# Patient Record
Sex: MALE | Race: WHITE | NOT HISPANIC OR LATINO
[De-identification: names, ages, dates, MRNs, and addresses within clinical notes are randomized per-mention and may not be internally consistent; named-entity substitution may affect disease eponyms.]

---

## 2018-04-06 ENCOUNTER — APPOINTMENT (OUTPATIENT)
Dept: ORTHOPEDIC SURGERY | Facility: CLINIC | Age: 53
End: 2018-04-06
Payer: COMMERCIAL

## 2018-04-06 DIAGNOSIS — Z78.9 OTHER SPECIFIED HEALTH STATUS: ICD-10-CM

## 2018-04-06 DIAGNOSIS — Z87.39 PERSONAL HISTORY OF OTHER DISEASES OF THE MUSCULOSKELETAL SYSTEM AND CONNECTIVE TISSUE: ICD-10-CM

## 2018-04-06 DIAGNOSIS — Z80.0 FAMILY HISTORY OF MALIGNANT NEOPLASM OF DIGESTIVE ORGANS: ICD-10-CM

## 2018-04-06 DIAGNOSIS — Z80.1 FAMILY HISTORY OF MALIGNANT NEOPLASM OF TRACHEA, BRONCHUS AND LUNG: ICD-10-CM

## 2018-04-06 DIAGNOSIS — F17.290 NICOTINE DEPENDENCE, OTHER TOBACCO PRODUCT, UNCOMPLICATED: ICD-10-CM

## 2018-04-06 PROCEDURE — 99214 OFFICE O/P EST MOD 30 MIN: CPT

## 2018-04-06 PROCEDURE — 72100 X-RAY EXAM L-S SPINE 2/3 VWS: CPT

## 2018-04-06 PROCEDURE — 73521 X-RAY EXAM HIPS BI 2 VIEWS: CPT

## 2018-04-10 RX ORDER — ASPIRIN 81 MG
81 TABLET, DELAYED RELEASE (ENTERIC COATED) ORAL
Refills: 0 | Status: ACTIVE | COMMUNITY

## 2018-04-20 ENCOUNTER — TRANSCRIPTION ENCOUNTER (OUTPATIENT)
Age: 53
End: 2018-04-20

## 2018-05-29 ENCOUNTER — OUTPATIENT (OUTPATIENT)
Dept: OUTPATIENT SERVICES | Facility: HOSPITAL | Age: 53
LOS: 1 days | End: 2018-05-29

## 2018-05-29 VITALS
HEART RATE: 59 BPM | SYSTOLIC BLOOD PRESSURE: 140 MMHG | TEMPERATURE: 99 F | HEIGHT: 67 IN | DIASTOLIC BLOOD PRESSURE: 78 MMHG | RESPIRATION RATE: 14 BRPM | WEIGHT: 203.93 LBS

## 2018-05-29 DIAGNOSIS — M16.12 UNILATERAL PRIMARY OSTEOARTHRITIS, LEFT HIP: ICD-10-CM

## 2018-05-29 DIAGNOSIS — M19.90 UNSPECIFIED OSTEOARTHRITIS, UNSPECIFIED SITE: ICD-10-CM

## 2018-05-29 LAB
APPEARANCE UR: CLEAR — SIGNIFICANT CHANGE UP
BILIRUB UR-MCNC: NEGATIVE — SIGNIFICANT CHANGE UP
BLD GP AB SCN SERPL QL: NEGATIVE — SIGNIFICANT CHANGE UP
BLOOD UR QL VISUAL: NEGATIVE — SIGNIFICANT CHANGE UP
BUN SERPL-MCNC: 17 MG/DL — SIGNIFICANT CHANGE UP (ref 7–23)
CALCIUM SERPL-MCNC: 9.4 MG/DL — SIGNIFICANT CHANGE UP (ref 8.4–10.5)
CHLORIDE SERPL-SCNC: 100 MMOL/L — SIGNIFICANT CHANGE UP (ref 98–107)
CO2 SERPL-SCNC: 25 MMOL/L — SIGNIFICANT CHANGE UP (ref 22–31)
COLOR SPEC: SIGNIFICANT CHANGE UP
CREAT SERPL-MCNC: 0.94 MG/DL — SIGNIFICANT CHANGE UP (ref 0.5–1.3)
GLUCOSE SERPL-MCNC: 75 MG/DL — SIGNIFICANT CHANGE UP (ref 70–99)
GLUCOSE UR-MCNC: NEGATIVE — SIGNIFICANT CHANGE UP
HBA1C BLD-MCNC: 5.4 % — SIGNIFICANT CHANGE UP (ref 4–5.6)
HCT VFR BLD CALC: 45.9 % — SIGNIFICANT CHANGE UP (ref 39–50)
HGB BLD-MCNC: 15.2 G/DL — SIGNIFICANT CHANGE UP (ref 13–17)
KETONES UR-MCNC: NEGATIVE — SIGNIFICANT CHANGE UP
LEUKOCYTE ESTERASE UR-ACNC: NEGATIVE — SIGNIFICANT CHANGE UP
MCHC RBC-ENTMCNC: 29.4 PG — SIGNIFICANT CHANGE UP (ref 27–34)
MCHC RBC-ENTMCNC: 33.1 % — SIGNIFICANT CHANGE UP (ref 32–36)
MCV RBC AUTO: 88.8 FL — SIGNIFICANT CHANGE UP (ref 80–100)
NITRITE UR-MCNC: NEGATIVE — SIGNIFICANT CHANGE UP
NRBC # FLD: 0 — SIGNIFICANT CHANGE UP
PH UR: 6 — SIGNIFICANT CHANGE UP (ref 4.6–8)
PLATELET # BLD AUTO: 244 K/UL — SIGNIFICANT CHANGE UP (ref 150–400)
PMV BLD: 10.1 FL — SIGNIFICANT CHANGE UP (ref 7–13)
POTASSIUM SERPL-MCNC: 4 MMOL/L — SIGNIFICANT CHANGE UP (ref 3.5–5.3)
POTASSIUM SERPL-SCNC: 4 MMOL/L — SIGNIFICANT CHANGE UP (ref 3.5–5.3)
PROT UR-MCNC: NEGATIVE MG/DL — SIGNIFICANT CHANGE UP
RBC # BLD: 5.17 M/UL — SIGNIFICANT CHANGE UP (ref 4.2–5.8)
RBC # FLD: 13 % — SIGNIFICANT CHANGE UP (ref 10.3–14.5)
RH IG SCN BLD-IMP: POSITIVE — SIGNIFICANT CHANGE UP
SODIUM SERPL-SCNC: 140 MMOL/L — SIGNIFICANT CHANGE UP (ref 135–145)
SP GR SPEC: 1.01 — SIGNIFICANT CHANGE UP (ref 1–1.04)
UROBILINOGEN FLD QL: NORMAL MG/DL — SIGNIFICANT CHANGE UP
WBC # BLD: 6.4 K/UL — SIGNIFICANT CHANGE UP (ref 3.8–10.5)
WBC # FLD AUTO: 6.4 K/UL — SIGNIFICANT CHANGE UP (ref 3.8–10.5)
WBC UR QL: SIGNIFICANT CHANGE UP (ref 0–?)

## 2018-05-29 RX ORDER — SODIUM CHLORIDE 9 MG/ML
3 INJECTION INTRAMUSCULAR; INTRAVENOUS; SUBCUTANEOUS EVERY 8 HOURS
Qty: 0 | Refills: 0 | Status: DISCONTINUED | OUTPATIENT
Start: 2018-06-18 | End: 2018-06-19

## 2018-05-29 RX ORDER — ACETAMINOPHEN 500 MG
975 TABLET ORAL ONCE
Qty: 0 | Refills: 0 | Status: COMPLETED | OUTPATIENT
Start: 2018-06-18 | End: 2018-06-18

## 2018-05-29 RX ORDER — ASPIRIN/CALCIUM CARB/MAGNESIUM 324 MG
1 TABLET ORAL
Qty: 0 | Refills: 0 | COMMUNITY

## 2018-05-29 RX ORDER — TRAMADOL HYDROCHLORIDE 50 MG/1
50 TABLET ORAL ONCE
Qty: 0 | Refills: 0 | Status: DISCONTINUED | OUTPATIENT
Start: 2018-06-18 | End: 2018-06-18

## 2018-05-29 RX ORDER — SODIUM CHLORIDE 9 MG/ML
1000 INJECTION, SOLUTION INTRAVENOUS
Qty: 0 | Refills: 0 | Status: DISCONTINUED | OUTPATIENT
Start: 2018-06-18 | End: 2018-06-18

## 2018-05-29 RX ORDER — PANTOPRAZOLE SODIUM 20 MG/1
40 TABLET, DELAYED RELEASE ORAL ONCE
Qty: 0 | Refills: 0 | Status: COMPLETED | OUTPATIENT
Start: 2018-06-18 | End: 2018-06-18

## 2018-05-29 NOTE — H&P PST ADULT - NEGATIVE CARDIOVASCULAR SYMPTOMS
no orthopnea/no paroxysmal nocturnal dyspnea/no peripheral edema/no palpitations/no dyspnea on exertion/no chest pain/no claudication

## 2018-05-29 NOTE — H&P PST ADULT - NEGATIVE GENERAL SYMPTOMS
no anorexia/no weight loss/no fever/no chills/no sweating/no polydipsia/no fatigue/no weight gain/no polyphagia/no polyuria/no malaise

## 2018-05-29 NOTE — H&P PST ADULT - RS GEN PE MLT RESP DETAILS PC
clear to auscultation bilaterally/breath sounds equal/no intercostal retractions/no rales/no wheezes/no chest wall tenderness/no rhonchi/respirations non-labored/good air movement

## 2018-05-29 NOTE — H&P PST ADULT - NSANTHOSAYNRD_GEN_A_CORE
No. KERRY screening performed.  STOP BANG Legend: 0-2 = LOW Risk; 3-4 = INTERMEDIATE Risk; 5-8 = HIGH Risk

## 2018-05-29 NOTE — H&P PST ADULT - GASTROINTESTINAL DETAILS
no distention/no guarding/no rebound tenderness/no bruit/no rigidity/no organomegaly/nontender/no masses palpable/bowel sounds normal/soft

## 2018-05-29 NOTE — H&P PST ADULT - NEGATIVE GENERAL GENITOURINARY SYMPTOMS
normal urinary frequency/no hematuria/no flank pain R/no bladder infections/no nocturia/no dysuria/no flank pain L

## 2018-05-29 NOTE — H&P PST ADULT - PROBLEM SELECTOR PLAN 1
Pt scheduled for left total hip replacement direct anterior approach on 6/18/2018.  labs done results pending, ekg done.  Hibiclens provided.  Preop teaching done, pt able to verbalize understanding.

## 2018-05-29 NOTE — H&P PST ADULT - HISTORY OF PRESENT ILLNESS
52y/o male scheduled for left total hip replacement direct anterior approach on 6/18/2018.  Pt states, "left hip pain for the past 20yrs, pain worse over the past 5 yrs.  Xray shows bone on bone. Dx with osteoarthritis 20 yrs ago.  Possible congential defect."

## 2018-05-29 NOTE — H&P PST ADULT - MARITAL STATUS
3 children, mother  67y/o colon and lung cancer, father  72 heart disease, RA, 4 siblings 2 brothers hx of prostate cancer, 1 brother with cardiac stents/

## 2018-05-30 LAB
BACTERIA UR CULT: SIGNIFICANT CHANGE UP
SPECIMEN SOURCE: SIGNIFICANT CHANGE UP
SPECIMEN SOURCE: SIGNIFICANT CHANGE UP

## 2018-05-31 LAB — BACTERIA NPH CULT: SIGNIFICANT CHANGE UP

## 2018-06-11 ENCOUNTER — OTHER (OUTPATIENT)
Age: 53
End: 2018-06-11

## 2018-06-13 ENCOUNTER — OTHER (OUTPATIENT)
Age: 53
End: 2018-06-13

## 2018-06-13 DIAGNOSIS — M16.11 UNILATERAL PRIMARY OSTEOARTHRITIS, RIGHT HIP: ICD-10-CM

## 2018-06-13 DIAGNOSIS — M16.12 UNILATERAL PRIMARY OSTEOARTHRITIS, LEFT HIP: ICD-10-CM

## 2018-06-17 ENCOUNTER — TRANSCRIPTION ENCOUNTER (OUTPATIENT)
Age: 53
End: 2018-06-17

## 2018-06-18 ENCOUNTER — INPATIENT (INPATIENT)
Facility: HOSPITAL | Age: 53
LOS: 0 days | Discharge: HOME | End: 2018-06-19
Attending: ORTHOPAEDIC SURGERY | Admitting: ORTHOPAEDIC SURGERY
Payer: COMMERCIAL

## 2018-06-18 ENCOUNTER — RESULT REVIEW (OUTPATIENT)
Age: 53
End: 2018-06-18

## 2018-06-18 ENCOUNTER — APPOINTMENT (OUTPATIENT)
Dept: ORTHOPEDIC SURGERY | Facility: HOSPITAL | Age: 53
End: 2018-06-18

## 2018-06-18 ENCOUNTER — TRANSCRIPTION ENCOUNTER (OUTPATIENT)
Age: 53
End: 2018-06-18

## 2018-06-18 VITALS
HEART RATE: 69 BPM | OXYGEN SATURATION: 97 % | HEIGHT: 67 IN | TEMPERATURE: 98 F | WEIGHT: 203.93 LBS | RESPIRATION RATE: 16 BRPM | DIASTOLIC BLOOD PRESSURE: 87 MMHG | SYSTOLIC BLOOD PRESSURE: 149 MMHG

## 2018-06-18 DIAGNOSIS — M16.12 UNILATERAL PRIMARY OSTEOARTHRITIS, LEFT HIP: ICD-10-CM

## 2018-06-18 DIAGNOSIS — Z09 ENCOUNTER FOR FOLLOW-UP EXAMINATION AFTER COMPLETED TREATMENT FOR CONDITIONS OTHER THAN MALIGNANT NEOPLASM: ICD-10-CM

## 2018-06-18 DIAGNOSIS — F17.200 NICOTINE DEPENDENCE, UNSPECIFIED, UNCOMPLICATED: ICD-10-CM

## 2018-06-18 LAB
BUN SERPL-MCNC: 15 MG/DL — SIGNIFICANT CHANGE UP (ref 7–23)
CALCIUM SERPL-MCNC: 8.5 MG/DL — SIGNIFICANT CHANGE UP (ref 8.4–10.5)
CHLORIDE SERPL-SCNC: 104 MMOL/L — SIGNIFICANT CHANGE UP (ref 98–107)
CO2 SERPL-SCNC: 24 MMOL/L — SIGNIFICANT CHANGE UP (ref 22–31)
CREAT SERPL-MCNC: 1.01 MG/DL — SIGNIFICANT CHANGE UP (ref 0.5–1.3)
GLUCOSE BLDC GLUCOMTR-MCNC: 97 MG/DL — SIGNIFICANT CHANGE UP (ref 70–99)
GLUCOSE SERPL-MCNC: 124 MG/DL — HIGH (ref 70–99)
HCT VFR BLD CALC: 40.3 % — SIGNIFICANT CHANGE UP (ref 39–50)
HGB BLD-MCNC: 13.6 G/DL — SIGNIFICANT CHANGE UP (ref 13–17)
MCHC RBC-ENTMCNC: 29.1 PG — SIGNIFICANT CHANGE UP (ref 27–34)
MCHC RBC-ENTMCNC: 33.7 % — SIGNIFICANT CHANGE UP (ref 32–36)
MCV RBC AUTO: 86.1 FL — SIGNIFICANT CHANGE UP (ref 80–100)
NRBC # FLD: 0 — SIGNIFICANT CHANGE UP
PLATELET # BLD AUTO: 220 K/UL — SIGNIFICANT CHANGE UP (ref 150–400)
PMV BLD: 9.8 FL — SIGNIFICANT CHANGE UP (ref 7–13)
POTASSIUM SERPL-MCNC: 4.2 MMOL/L — SIGNIFICANT CHANGE UP (ref 3.5–5.3)
POTASSIUM SERPL-SCNC: 4.2 MMOL/L — SIGNIFICANT CHANGE UP (ref 3.5–5.3)
RBC # BLD: 4.68 M/UL — SIGNIFICANT CHANGE UP (ref 4.2–5.8)
RBC # FLD: 13.1 % — SIGNIFICANT CHANGE UP (ref 10.3–14.5)
RH IG SCN BLD-IMP: POSITIVE — SIGNIFICANT CHANGE UP
SODIUM SERPL-SCNC: 141 MMOL/L — SIGNIFICANT CHANGE UP (ref 135–145)
WBC # BLD: 10.38 K/UL — SIGNIFICANT CHANGE UP (ref 3.8–10.5)
WBC # FLD AUTO: 10.38 K/UL — SIGNIFICANT CHANGE UP (ref 3.8–10.5)

## 2018-06-18 PROCEDURE — 88311 DECALCIFY TISSUE: CPT | Mod: 26

## 2018-06-18 PROCEDURE — 88305 TISSUE EXAM BY PATHOLOGIST: CPT | Mod: 26

## 2018-06-18 PROCEDURE — 27130 TOTAL HIP ARTHROPLASTY: CPT | Mod: LT

## 2018-06-18 PROCEDURE — 72170 X-RAY EXAM OF PELVIS: CPT | Mod: 26

## 2018-06-18 PROCEDURE — 99223 1ST HOSP IP/OBS HIGH 75: CPT

## 2018-06-18 RX ORDER — SODIUM CHLORIDE 9 MG/ML
1000 INJECTION INTRAMUSCULAR; INTRAVENOUS; SUBCUTANEOUS ONCE
Qty: 0 | Refills: 0 | Status: COMPLETED | OUTPATIENT
Start: 2018-06-19 | End: 2018-06-19

## 2018-06-18 RX ORDER — HYDROMORPHONE HYDROCHLORIDE 2 MG/ML
0.5 INJECTION INTRAMUSCULAR; INTRAVENOUS; SUBCUTANEOUS
Qty: 0 | Refills: 0 | Status: DISCONTINUED | OUTPATIENT
Start: 2018-06-18 | End: 2018-06-18

## 2018-06-18 RX ORDER — ACETAMINOPHEN 500 MG
650 TABLET ORAL EVERY 6 HOURS
Qty: 0 | Refills: 0 | Status: DISCONTINUED | OUTPATIENT
Start: 2018-06-18 | End: 2018-06-19

## 2018-06-18 RX ORDER — SODIUM CHLORIDE 9 MG/ML
1000 INJECTION, SOLUTION INTRAVENOUS
Qty: 0 | Refills: 0 | Status: DISCONTINUED | OUTPATIENT
Start: 2018-06-18 | End: 2018-06-19

## 2018-06-18 RX ORDER — OXYCODONE HYDROCHLORIDE 5 MG/1
10 TABLET ORAL EVERY 4 HOURS
Qty: 0 | Refills: 0 | Status: DISCONTINUED | OUTPATIENT
Start: 2018-06-18 | End: 2018-06-19

## 2018-06-18 RX ORDER — PANTOPRAZOLE SODIUM 20 MG/1
40 TABLET, DELAYED RELEASE ORAL DAILY
Qty: 0 | Refills: 0 | Status: DISCONTINUED | OUTPATIENT
Start: 2018-06-18 | End: 2018-06-19

## 2018-06-18 RX ORDER — LORATADINE 10 MG/1
10 TABLET ORAL DAILY
Qty: 0 | Refills: 0 | Status: DISCONTINUED | OUTPATIENT
Start: 2018-06-18 | End: 2018-06-19

## 2018-06-18 RX ORDER — POLYETHYLENE GLYCOL 3350 17 G/17G
17 POWDER, FOR SOLUTION ORAL DAILY
Qty: 0 | Refills: 0 | Status: DISCONTINUED | OUTPATIENT
Start: 2018-06-18 | End: 2018-06-19

## 2018-06-18 RX ORDER — HYDROMORPHONE HYDROCHLORIDE 2 MG/ML
0.5 INJECTION INTRAMUSCULAR; INTRAVENOUS; SUBCUTANEOUS EVERY 4 HOURS
Qty: 0 | Refills: 0 | Status: DISCONTINUED | OUTPATIENT
Start: 2018-06-18 | End: 2018-06-18

## 2018-06-18 RX ORDER — TRAMADOL HYDROCHLORIDE 50 MG/1
50 TABLET ORAL EVERY 8 HOURS
Qty: 0 | Refills: 0 | Status: DISCONTINUED | OUTPATIENT
Start: 2018-06-18 | End: 2018-06-18

## 2018-06-18 RX ORDER — CELECOXIB 200 MG/1
200 CAPSULE ORAL DAILY
Qty: 0 | Refills: 0 | Status: DISCONTINUED | OUTPATIENT
Start: 2018-06-20 | End: 2018-06-19

## 2018-06-18 RX ORDER — DOCUSATE SODIUM 100 MG
100 CAPSULE ORAL THREE TIMES A DAY
Qty: 0 | Refills: 0 | Status: DISCONTINUED | OUTPATIENT
Start: 2018-06-18 | End: 2018-06-19

## 2018-06-18 RX ORDER — SENNA PLUS 8.6 MG/1
2 TABLET ORAL AT BEDTIME
Qty: 0 | Refills: 0 | Status: DISCONTINUED | OUTPATIENT
Start: 2018-06-18 | End: 2018-06-19

## 2018-06-18 RX ORDER — TRAMADOL HYDROCHLORIDE 50 MG/1
50 TABLET ORAL EVERY 8 HOURS
Qty: 0 | Refills: 0 | Status: DISCONTINUED | OUTPATIENT
Start: 2018-06-18 | End: 2018-06-19

## 2018-06-18 RX ORDER — KETOROLAC TROMETHAMINE 30 MG/ML
15 SYRINGE (ML) INJECTION EVERY 6 HOURS
Qty: 0 | Refills: 0 | Status: DISCONTINUED | OUTPATIENT
Start: 2018-06-18 | End: 2018-06-19

## 2018-06-18 RX ORDER — DEXAMETHASONE 0.5 MG/5ML
10 ELIXIR ORAL ONCE
Qty: 0 | Refills: 0 | Status: COMPLETED | OUTPATIENT
Start: 2018-06-18 | End: 2018-06-18

## 2018-06-18 RX ORDER — ONDANSETRON 8 MG/1
4 TABLET, FILM COATED ORAL EVERY 6 HOURS
Qty: 0 | Refills: 0 | Status: DISCONTINUED | OUTPATIENT
Start: 2018-06-18 | End: 2018-06-19

## 2018-06-18 RX ORDER — HYDROMORPHONE HYDROCHLORIDE 2 MG/ML
0.5 INJECTION INTRAMUSCULAR; INTRAVENOUS; SUBCUTANEOUS EVERY 4 HOURS
Qty: 0 | Refills: 0 | Status: DISCONTINUED | OUTPATIENT
Start: 2018-06-18 | End: 2018-06-19

## 2018-06-18 RX ORDER — ASPIRIN/CALCIUM CARB/MAGNESIUM 324 MG
325 TABLET ORAL
Qty: 0 | Refills: 0 | Status: DISCONTINUED | OUTPATIENT
Start: 2018-06-18 | End: 2018-06-19

## 2018-06-18 RX ORDER — CEFAZOLIN SODIUM 1 G
2000 VIAL (EA) INJECTION EVERY 8 HOURS
Qty: 0 | Refills: 0 | Status: COMPLETED | OUTPATIENT
Start: 2018-06-18 | End: 2018-06-18

## 2018-06-18 RX ORDER — HYDROMORPHONE HYDROCHLORIDE 2 MG/ML
0.25 INJECTION INTRAMUSCULAR; INTRAVENOUS; SUBCUTANEOUS
Qty: 0 | Refills: 0 | Status: DISCONTINUED | OUTPATIENT
Start: 2018-06-18 | End: 2018-06-18

## 2018-06-18 RX ORDER — SODIUM CHLORIDE 9 MG/ML
1000 INJECTION INTRAMUSCULAR; INTRAVENOUS; SUBCUTANEOUS ONCE
Qty: 0 | Refills: 0 | Status: COMPLETED | OUTPATIENT
Start: 2018-06-18 | End: 2018-06-18

## 2018-06-18 RX ORDER — DEXAMETHASONE 0.5 MG/5ML
10 ELIXIR ORAL ONCE
Qty: 0 | Refills: 0 | Status: COMPLETED | OUTPATIENT
Start: 2018-06-19 | End: 2018-06-19

## 2018-06-18 RX ORDER — OXYCODONE HYDROCHLORIDE 5 MG/1
5 TABLET ORAL EVERY 4 HOURS
Qty: 0 | Refills: 0 | Status: DISCONTINUED | OUTPATIENT
Start: 2018-06-18 | End: 2018-06-19

## 2018-06-18 RX ADMIN — Medication 150 MILLIGRAM(S): at 06:40

## 2018-06-18 RX ADMIN — Medication 325 MILLIGRAM(S): at 17:47

## 2018-06-18 RX ADMIN — Medication 102 MILLIGRAM(S): at 20:01

## 2018-06-18 RX ADMIN — Medication 100 MILLIGRAM(S): at 23:17

## 2018-06-18 RX ADMIN — SODIUM CHLORIDE 150 MILLILITER(S): 9 INJECTION, SOLUTION INTRAVENOUS at 09:50

## 2018-06-18 RX ADMIN — SODIUM CHLORIDE 1000 MILLILITER(S): 9 INJECTION INTRAMUSCULAR; INTRAVENOUS; SUBCUTANEOUS at 11:34

## 2018-06-18 RX ADMIN — Medication 100 MILLIGRAM(S): at 13:38

## 2018-06-18 RX ADMIN — SODIUM CHLORIDE 3 MILLILITER(S): 9 INJECTION INTRAMUSCULAR; INTRAVENOUS; SUBCUTANEOUS at 21:43

## 2018-06-18 RX ADMIN — SENNA PLUS 2 TABLET(S): 8.6 TABLET ORAL at 21:54

## 2018-06-18 RX ADMIN — Medication 650 MILLIGRAM(S): at 12:12

## 2018-06-18 RX ADMIN — Medication 650 MILLIGRAM(S): at 23:17

## 2018-06-18 RX ADMIN — SODIUM CHLORIDE 150 MILLILITER(S): 9 INJECTION, SOLUTION INTRAVENOUS at 11:34

## 2018-06-18 RX ADMIN — Medication 15 MILLIGRAM(S): at 17:48

## 2018-06-18 RX ADMIN — Medication 100 MILLIGRAM(S): at 15:55

## 2018-06-18 RX ADMIN — Medication 650 MILLIGRAM(S): at 17:47

## 2018-06-18 RX ADMIN — PANTOPRAZOLE SODIUM 40 MILLIGRAM(S): 20 TABLET, DELAYED RELEASE ORAL at 06:40

## 2018-06-18 RX ADMIN — Medication 975 MILLIGRAM(S): at 06:40

## 2018-06-18 RX ADMIN — PANTOPRAZOLE SODIUM 40 MILLIGRAM(S): 20 TABLET, DELAYED RELEASE ORAL at 12:12

## 2018-06-18 RX ADMIN — TRAMADOL HYDROCHLORIDE 50 MILLIGRAM(S): 50 TABLET ORAL at 06:40

## 2018-06-18 RX ADMIN — Medication 100 MILLIGRAM(S): at 21:54

## 2018-06-18 RX ADMIN — SODIUM CHLORIDE 30 MILLILITER(S): 9 INJECTION, SOLUTION INTRAVENOUS at 06:40

## 2018-06-18 NOTE — DISCHARGE NOTE ADULT - PLAN OF CARE
pain control-> pain med may cause drowsiness, refrain from activities require decision making; physical therapy to help resume activities of daily living Office appointment is already made (see card attached to discharge folder) so if you need to reschedule please call Dr. Vega's office [phone]  weight bearing as tolerated to Left leg  TOTAL HIP PRECAUTIONS ANTERIOR

## 2018-06-18 NOTE — ASU PREOP CHECKLIST - SELECT TESTS ORDERED
EKG/CBC/Type and Cross/Urinalysis/BMP/hga1c, nasal swab, urine cul EKG/CBC/Type and Cross/POCT Blood Glucose/Urinalysis/BMP/hga1c, nasal swab, urine cul

## 2018-06-18 NOTE — DISCHARGE NOTE ADULT - ADDITIONAL INSTRUCTIONS
post surgical care  54yo male is s/p elective Left total hip arthroplasty 6/18/2018 without any intraoperative complications.  Patient is doing well and stable for discharge.  Patient is tolerating physical therapy: weight bearing to Left leg, gait training, STRICT ANTERIOR HIP PRECAUTIONS.  Keep dressing on as is until day of staple removal.  Staples/sutures to be removed in Dr. Vega's office 14 days from date of surgery.  Patient is on Aspirin (MUST TAKE WITH FOOD) for anticoagulation.  Orthopaedic Surgeon Dr. Vega would like patient to call private MD/Internist for appointment postop to maintain continuity of care.  Follow up with Dr. Vega's office in 1-2 weeks.

## 2018-06-18 NOTE — PHYSICAL THERAPY INITIAL EVALUATION ADULT - PATIENT PROFILE REVIEW, REHAB EVAL
yes/OK to perform PT evaluation as per ARACELI Lackey, activity order- ambulate as tolerated with a walker

## 2018-06-18 NOTE — CONSULT NOTE ADULT - PROBLEM SELECTOR RECOMMENDATION 9
s/p L total hip replacement POD#0  PT/OT  Pain control- Continue current pain regimen. pt reports he doesn't like opiods.  Bowel regimen  Encourage incentive spirometry  DVT ppx as per ortho s/p L total hip replacement POD#0  Perioperative abx as per ortho  PT/OT  Pain control- Continue current pain regimen. pt reports he doesn't like opiods.  Bowel regimen  Encourage incentive spirometry  DVT ppx as per ortho

## 2018-06-18 NOTE — CONSULT NOTE ADULT - ASSESSMENT
53 year old man PMH of osteoarthritis diagnosed 20 years ago and worsening L hip pain presented for scheduled left total hip replacement via direct anterior approach on 6/18/2018. Pt is  POD#0 doing well

## 2018-06-18 NOTE — DISCHARGE NOTE ADULT - INSTRUCTIONS
resume same diet as prior to surgery You have a postop appointment with dr Vega on 7/6/2018 @ 8:00 am, please keep waterproof dressing on until this appointment.  Notify Dr Vega if you experience any increase in pain not relieved with pain medication, anyredness, drainage or swelling around incision or if you develop a fever >101.0  Drink plentyof fluids.  Continue to do exercises as instructed.  Maintain anterior hip precautions.  do not sit in a chair for longer than 45 minutes twice a day.  Use over the counter stool softeners to assist with constipation which can be a side effect of your pain medication. You have a postop appointment with dr Vega on 7/6/2018 @ 8:00 am, please keep waterproof dressing on until this appointment.  Notify Dr Vega if you experience any increase in pain not relieved with pain medication, any redness, drainage or swelling around incision or if you develop a fever >101.0  Drink plenty of fluids.  Continue to do exercises as instructed.  Maintain anterior hip precautions.  Do not sit in a chair for longer than 45 minutes twice a day.  Use over the counter stool softeners to assist with constipation which can be a side effect of your pain medication.

## 2018-06-18 NOTE — PROGRESS NOTE ADULT - SUBJECTIVE AND OBJECTIVE BOX
Ortho Post Op Note    Patient resting comfortably, pain controlled, no acute events.  Patient denies any chest pain, shortness of breath, nausea, vomiting, diarrhea, headache or dizziness.  Denies any numbness or tingling.  Patient currently has no complaints.    Vital Signs: Vital Signs Last 24 Hrs  T(C): 36.7 (18 Jun 2018 11:32), Max: 36.7 (18 Jun 2018 06:05)  T(F): 98 (18 Jun 2018 11:32), Max: 98.1 (18 Jun 2018 06:05)  HR: 87 (18 Jun 2018 11:32) (69 - 96)  BP: 143/85 (18 Jun 2018 11:32) (124/85 - 149/87)  BP(mean): --  RR: 17 (18 Jun 2018 11:32) (11 - 17)  SpO2: 98% (18 Jun 2018 11:32) (97% - 100%)    Gen: Alert, NAD  LLE: Dressing clean, dry and intact, no erythema.  Extremity warm, brisk capillary refill, compartments soft, +2 DP pulse, no calf tenderness.  5/5 EHL/FHL/GS/TA, SILT S/S/DP/SP/T    A/P: 53y Male s/p left total hip replacement  1. Pain management  2. Ancef x24 hours  3. WBAT, anterior precautions  4. PT/OT  5. ASA BID for DVT ppx   6. Venodynes  7. Incentive spirometry  8. D/c planning

## 2018-06-18 NOTE — DISCHARGE NOTE ADULT - MEDICATION SUMMARY - MEDICATIONS TO TAKE
I will START or STAY ON the medications listed below when I get home from the hospital:    Mobic 15 mg oral tablet  -- 1 tab(s) by mouth once a day MDD:1  -- Do not take this drug if you are pregnant.  Obtain medical advice before taking any non-prescription drugs as some may affect the action of this medication.  Take with food or milk.    -- Indication: For Pain    Percocet 5/325 oral tablet  -- 1 -2 tab(s) by mouth every 4 hours MDD:8.  -- Caution federal law prohibits the transfer of this drug to any person other  than the person for whom it was prescribed.  May cause drowsiness.  Alcohol may intensify this effect.  Use care when operating dangerous machinery.  This prescription cannot be refilled.  This product contains acetaminophen.  Do not use  with any other product containing acetaminophen to prevent possible liver damage.  Using more of this medication than prescribed may cause serious breathing problems.    -- Indication: For Pain    traMADol 50 mg oral tablet  -- 1 tab(s) by mouth every 8 hours MDD:3  -- Indication: For Pain    aspirin 325 mg oral delayed release tablet  -- 1 tab(s) by mouth 2 times a day MDD:2  -- Indication: For Prevention of blood clots    gabapentin 100 mg oral capsule  -- 1 cap(s) by mouth 3 times a day MDD:3  -- It is very important that you take or use this exactly as directed.  Do not skip doses or discontinue unless directed by your doctor.  May cause drowsiness.  Alcohol may intensify this effect.  Use care when operating dangerous machinery.    -- Indication: For Pain    cetirizine 10 mg oral tablet  -- 1 tab(s) by mouth once a day  -- Indication: For Home med    docusate sodium 100 mg oral capsule  -- 1 cap(s) by mouth 3 times a day MDD:3  -- Indication: For Stool softener    senna oral tablet  -- 2 tab(s) by mouth once a day (at bedtime) MDD:2  -- Indication: For Stool softener    pantoprazole 40 mg oral delayed release tablet  -- 1 tab(s) by mouth once a day MDD:1  -- Indication: For Stomach protectant    Vitamin C 1000 mg oral tablet  -- 1 tab(s) by mouth once a day  -- Indication: For vit    vitamin E 400 intl units oral capsule  -- 1 cap(s) by mouth once a day  last dsoe 6/11  -- Indication: For vit

## 2018-06-18 NOTE — DISCHARGE NOTE ADULT - NS AS DC FOLLOWUP STROKE INST
carenotes on total hip, anterior precautions, exercise worksheet, d/c medications and side effects pamphlet

## 2018-06-18 NOTE — DISCHARGE NOTE ADULT - HOME CARE AGENCY
[facility]  [phone]  Visiting RN to see patient day after discharge; Start of visit 6/20/18; Physical therapy to follow

## 2018-06-18 NOTE — OCCUPATIONAL THERAPY INITIAL EVALUATION ADULT - PERTINENT HX OF CURRENT PROBLEM, REHAB EVAL
54y/o male scheduled for left total hip replacement direct anterior approach on 6/18/2018.  Pt states, "left hip pain for the past 20yrs, pain worse over the past 5 yrs.  Xray shows bone on bone. Dx with osteoarthritis 20 yrs ago.  Possible congential defect."

## 2018-06-18 NOTE — DISCHARGE NOTE ADULT - MEDICATION SUMMARY - MEDICATIONS TO CHANGE
I will SWITCH the dose or number of times a day I take the medications listed below when I get home from the hospital:    aspirin 81 mg oral tablet  -- 1 tab(s) by mouth once a day, last dose 6/11/18

## 2018-06-18 NOTE — DISCHARGE NOTE ADULT - HOSPITAL COURSE
52yo male is s/p elective Left total hip arthroplasty 6/18/2018 without any intraoperative complications.  Patient is doing well and stable for discharge.  Patient is tolerating physical therapy: weight bearing to Left leg, gait training, STRICT ANTERIOR HIP PRECAUTIONS.  Keep dressing on as is until day of staple removal.  Staples/sutures to be removed in Dr. Vega's office 14 days from date of surgery.  Patient is on Aspirin (MUST TAKE WITH FOOD) for anticoagulation.  Orthopaedic Surgeon Dr. Vega would like patient to call private MD/Internist for appointment postop to maintain continuity of care.  Follow up with Dr. Vega's office in 1-2 weeks. 54yo male is s/p elective Left total hip arthroplasty 6/18/2018 without any intraoperative complications.  Patient is doing well and stable for discharge.  Patient is tolerating physical therapy: weight bearing to Left leg, gait training, STRICT ANTERIOR HIP PRECAUTIONS.  Keep dressing on as is until day of staple removal.  Staples/sutures to be removed in Dr. Vega's office 14 days from date of surgery.   Pt on ECASA  325 mg po bid for anticoagulation (MUST TAKE WITH FOOD) .  Orthopaedic Surgeon Dr. Vega would like patient to call private MD/Internist for appointment postop to maintain continuity of care.  Follow up with Dr. Vega's office in 1-2 weeks.

## 2018-06-18 NOTE — CONSULT NOTE ADULT - SUBJECTIVE AND OBJECTIVE BOX
CHIEF COMPLAINT: Patient is a 53y old  Male who presents with a chief complaint of elective Left total hip arthroplasty 6/18/2018 (18 Jun 2018 12:19)      HPI: 53 year old man PMH of osteoarthritis diagnosed 20 years ago and worsening L hip pain presented for scheduled left total hip replacement via direct anterior approach on 6/18/2018.  Pt states that his left hip pain became progressively worse over the past 5 yrs. He tried supplements and other alternatives without relief. Pain affected his gait tremendously and unable to lift his leg much. Xray showed severe " bone on bone" possible congential defect." Pt is now s/p L MARY this am which he tolerated well.     Reports he has been physically active preparing for surgery and did the elliptical for an hour yesterday. He has been strengthening his quads. Pt reports he has no pain just some "pulling" at the surgical site but notes he has high pain tolerance at baseline. Denies any numbness or tingling in his legs. Walked w/ PT up and down the vee this afternoon. Also worked with OT. Tolerated a meal w/o nausea/vomiting. Hasn't passed gas as yet. Voiding well.     Pt denies chest pain, palpitations, dyspnea.      Pain Symptoms if applicable:             	                         none	   mild         moderate         severe  	                            0	    1-3	     4-6	         7-10  Pain: L hip  Location:	  Modifying factors:	  Associated symptoms:	    Allergies    No Known Allergies    Intolerances        HOME MEDICATIONS: [x] Reviewed with patient at bedside    MEDICATIONS  (STANDING): reviewed  acetaminophen   Tablet 650 milliGRAM(s) Oral every 6 hours  aspirin enteric coated 325 milliGRAM(s) Oral two times a day  ceFAZolin   IVPB 2000 milliGRAM(s) IV Intermittent every 8 hours  dexamethasone  IVPB 10 milliGRAM(s) IV Intermittent once  docusate sodium 100 milliGRAM(s) Oral three times a day  ketorolac   Injectable 15 milliGRAM(s) IV Push every 6 hours  lactated ringers. 1000 milliLiter(s) (150 mL/Hr) IV Continuous <Continuous>  loratadine 10 milliGRAM(s) Oral daily  pantoprazole    Tablet 40 milliGRAM(s) Oral daily  polyethylene glycol 3350 17 Gram(s) Oral daily  pregabalin 150 milliGRAM(s) Oral once  senna 2 Tablet(s) Oral at bedtime  sodium chloride 0.9% lock flush 3 milliLiter(s) IV Push every 8 hours  traMADol 50 milliGRAM(s) Oral every 8 hours    MEDICATIONS  (PRN):  acetaminophen   Tablet 650 milliGRAM(s) Oral every 6 hours PRN For Temp over 38.3 C (100.94 F)  aluminum hydroxide/magnesium hydroxide/simethicone Suspension 30 milliLiter(s) Oral four times a day PRN Indigestion  HYDROmorphone  Injectable 0.5 milliGRAM(s) IV Push every 4 hours PRN break through pain  ondansetron Injectable 4 milliGRAM(s) IV Push every 6 hours PRN Nausea and/or Vomiting  oxyCODONE    IR 5 milliGRAM(s) Oral every 4 hours PRN mild and moderate pain  oxyCODONE    IR 10 milliGRAM(s) Oral every 4 hours PRN Severe Pain (7 - 10)      PAST MEDICAL & SURGICAL HISTORY:  Hyperlipidemia  Seasonal allergies  History of BPH  Osteoarthritis  No significant past surgical history  [x ] Reviewed     SOCIAL HISTORY:  .  with children.   [x] Substance abuse: Denies  [x] Alcohol use: Denies  [x] Tobacco: Smokes 6 cigars or more per day    FAMILY HISTORY:  [x] No pertinent family history in first degree relatives     REVIEW OF SYSTEMS:  [x] All other ROS negative  [  ] Unable to obtain due to poor mental status    Vital Signs Last 24 Hrs  T(C): 36.8 (18 Jun 2018 13:35), Max: 36.8 (18 Jun 2018 13:35)  T(F): 98.3 (18 Jun 2018 13:35), Max: 98.3 (18 Jun 2018 13:35)  HR: 88 (18 Jun 2018 13:35) (69 - 96)  BP: 128/76 (18 Jun 2018 13:35) (124/85 - 149/87)  BP(mean): --  RR: 17 (18 Jun 2018 13:35) (11 - 17)  SpO2: 96% (18 Jun 2018 13:35) (96% - 100%)    PHYSICAL EXAM: wife at bedside  GENERAL: sitting up in bed in no acute distress, well-groomed, well-developed  HEENT: NCAT, EOMI, conjunctiva and sclera clear  ENMT: Moist mucous membranes  NECK: Supple, No JVD  RESPIRATORY: Clear to auscultation bilaterally; No rales, rhonchi, wheezing  CARDIOVASCULAR: S1/ S2, regular rate and rhythm  GASTROINTESTINAL: Soft, Nontender, Nondistended; Bowel sounds present  EXTREMITIES: L hip edematous, dressing c/d/i, 2+ Peripheral Pulses, No clubbing, cyanosis, no peripheral edema  NERVOUS SYSTEM:  Alert & Oriented X3; Moving all 4 extremities    LABS: reviewed                        13.6   10.38 )-----------( 220      ( 18 Jun 2018 09:44 )             40.3     Hemoglobin: 13.6 g/dL (06-18 @ 09:44)    06-18    141  |  104  |  15  ----------------------------<  124<H>  4.2   |  24  |  1.01    Ca    8.5      18 Jun 2018 09:44      Microbiology     RADIOLOGY & ADDITIONAL STUDIES:    EKG:   Personally Reviewed:  [x] YES     Imaging:   Personally Reviewed:  [x] YES   6/18/18 Xray pelvis reviewed              [ ] Consultant(s) Notes Reviewed  [x] Care Discussed with Consultants/Other Providers: Ortho PA - discussed pt s/p OR today. PT CHIEF COMPLAINT: Patient is a 53y old  Male who presents with a chief complaint of elective Left total hip arthroplasty 6/18/2018 (18 Jun 2018 12:19)      HPI: 53 year old man PMH of osteoarthritis diagnosed 20 years ago and worsening L hip pain presented for scheduled left total hip replacement via direct anterior approach on 6/18/2018.  Pt states that his left hip pain became progressively worse over the past 5 yrs. He tried supplements and other alternatives without relief. Pain affected his gait tremendously and unable to lift his leg much. Xray showed severe " bone on bone" possible congential defect." Pt is now s/p L MARY this am which he tolerated well.     Reports he has been physically active preparing for surgery and did the elliptical for an hour yesterday. He has been strengthening his quads. Pt reports he has no pain just some "pulling" at the surgical site but notes he has high pain tolerance at baseline. Denies any numbness or tingling in his legs. Walked w/ PT up and down the vee this afternoon. Also worked with OT. Tolerated a meal w/o nausea/vomiting. Hasn't passed gas as yet. Voiding well.     Pt denies chest pain, palpitations, dyspnea.      Pain Symptoms if applicable:             	                         none	   mild         moderate         severe  	                            0	    1-3	     4-6	         7-10  Pain: L hip  Location:	  Modifying factors:	  Associated symptoms:	    Allergies    No Known Allergies    Intolerances        HOME MEDICATIONS: [x] Reviewed with patient at bedside    MEDICATIONS  (STANDING): reviewed  acetaminophen   Tablet 650 milliGRAM(s) Oral every 6 hours  aspirin enteric coated 325 milliGRAM(s) Oral two times a day  ceFAZolin   IVPB 2000 milliGRAM(s) IV Intermittent every 8 hours  dexamethasone  IVPB 10 milliGRAM(s) IV Intermittent once  docusate sodium 100 milliGRAM(s) Oral three times a day  ketorolac   Injectable 15 milliGRAM(s) IV Push every 6 hours  lactated ringers. 1000 milliLiter(s) (150 mL/Hr) IV Continuous <Continuous>  loratadine 10 milliGRAM(s) Oral daily  pantoprazole    Tablet 40 milliGRAM(s) Oral daily  polyethylene glycol 3350 17 Gram(s) Oral daily  pregabalin 150 milliGRAM(s) Oral once  senna 2 Tablet(s) Oral at bedtime  sodium chloride 0.9% lock flush 3 milliLiter(s) IV Push every 8 hours  traMADol 50 milliGRAM(s) Oral every 8 hours    MEDICATIONS  (PRN):  acetaminophen   Tablet 650 milliGRAM(s) Oral every 6 hours PRN For Temp over 38.3 C (100.94 F)  aluminum hydroxide/magnesium hydroxide/simethicone Suspension 30 milliLiter(s) Oral four times a day PRN Indigestion  HYDROmorphone  Injectable 0.5 milliGRAM(s) IV Push every 4 hours PRN break through pain  ondansetron Injectable 4 milliGRAM(s) IV Push every 6 hours PRN Nausea and/or Vomiting  oxyCODONE    IR 5 milliGRAM(s) Oral every 4 hours PRN mild and moderate pain  oxyCODONE    IR 10 milliGRAM(s) Oral every 4 hours PRN Severe Pain (7 - 10)      PAST MEDICAL & SURGICAL HISTORY:  Hyperlipidemia  Seasonal allergies  History of BPH  Osteoarthritis  No significant past surgical history  [x ] Reviewed     SOCIAL HISTORY:  .  with children.   [x] Substance abuse: Denies  [x] Alcohol use: Denies  [x] Tobacco: Smokes 6 cigars or more per day    FAMILY HISTORY:  [x] No pertinent family history in first degree relatives     REVIEW OF SYSTEMS:  [x] All other ROS negative  [  ] Unable to obtain due to poor mental status    Vital Signs Last 24 Hrs  T(C): 36.8 (18 Jun 2018 13:35), Max: 36.8 (18 Jun 2018 13:35)  T(F): 98.3 (18 Jun 2018 13:35), Max: 98.3 (18 Jun 2018 13:35)  HR: 88 (18 Jun 2018 13:35) (69 - 96)  BP: 128/76 (18 Jun 2018 13:35) (124/85 - 149/87)  BP(mean): --  RR: 17 (18 Jun 2018 13:35) (11 - 17)  SpO2: 96% (18 Jun 2018 13:35) (96% - 100%)    PHYSICAL EXAM: wife at bedside  GENERAL: sitting up in bed in no acute distress, well-groomed, well-developed  HEENT: NCAT, EOMI, conjunctiva and sclera clear  ENMT: Moist mucous membranes  NECK: Supple, No JVD  RESPIRATORY: Clear to auscultation bilaterally; No rales, rhonchi, wheezing  CARDIOVASCULAR: S1/ S2, regular rate and rhythm  GASTROINTESTINAL: Soft, Nontender, Nondistended; Bowel sounds present  EXTREMITIES: L hip edematous, dressing c/d/i, 2+ Peripheral Pulses, No clubbing, cyanosis, no peripheral edema  NERVOUS SYSTEM:  Alert & Oriented X3; Moving all 4 extremities    LABS: reviewed                        13.6   10.38 )-----------( 220      ( 18 Jun 2018 09:44 )             40.3     Hemoglobin: 13.6 g/dL (06-18 @ 09:44)    06-18    141  |  104  |  15  ----------------------------<  124<H>  4.2   |  24  |  1.01    Ca    8.5      18 Jun 2018 09:44      Microbiology     RADIOLOGY & ADDITIONAL STUDIES:    EKG:   Personally Reviewed:  [x] YES 5/29/18 sinus bradycardia @59bpm, nl axes, nl intervals, no STT abnormalities    Imaging:   Personally Reviewed:  [x] YES   6/18/18 Xray pelvis reviewed- Cementless left total hip prosthesis with screw fixated acetabular cup   implanted.Intact and aligned hardware and no periprosthetic fractures.Postoperative soft tissue changes.Correlate with intraoperative findings.  Advanced right hip osteoarthritis also apparent.                  [ ] Consultant(s) Notes Reviewed  [x] Care Discussed with Consultants/Other Providers: Ortho PA - discussed pt s/p OR today. PT

## 2018-06-18 NOTE — DISCHARGE NOTE ADULT - CARE PLAN
Principal Discharge DX:	Primary osteoarthritis of left hip  Goal:	pain control-> pain med may cause drowsiness, refrain from activities require decision making; physical therapy to help resume activities of daily living  Assessment and plan of treatment:	Office appointment is already made (see card attached to discharge folder) so if you need to reschedule please call Dr. Vega's office [phone]  weight bearing as tolerated to Left leg  TOTAL HIP PRECAUTIONS ANTERIOR

## 2018-06-18 NOTE — DISCHARGE NOTE ADULT - CARE PROVIDERS DIRECT ADDRESSES
[email or web address]

## 2018-06-18 NOTE — DISCHARGE NOTE ADULT - CARE PROVIDER_API CALL
Darian Vega), Orthopaedic Surgery  [address] [address]  Phone: [phone]  Fax: [phone]

## 2018-06-18 NOTE — PHYSICAL THERAPY INITIAL EVALUATION ADULT - ACTIVE RANGE OF MOTION EXAMINATION, REHAB EVAL
bilateral upper extremity Active ROM was WFL (within functional limits)/except L hip flexion 0-40 degrees/bilateral  lower extremity Active ROM was WFL (within functional limits)

## 2018-06-18 NOTE — BRIEF OPERATIVE NOTE - PROCEDURE
<<-----Click on this checkbox to enter Procedure Arthroplasty of left hip by anterior approach  06/18/2018    Active  LIONEL

## 2018-06-18 NOTE — DISCHARGE NOTE ADULT - PATIENT PORTAL LINK FT
You can access the [facility] Patient Portal, offered by [facility], by registering with the following website: [email or web address]

## 2018-06-18 NOTE — DISCHARGE NOTE ADULT - CONDITIONS AT DISCHARGE
stable stable, tolerating diet, voiding well, oob with walker Left hip waterproof dressing in place clean dry and intact

## 2018-06-19 VITALS
TEMPERATURE: 98 F | RESPIRATION RATE: 18 BRPM | DIASTOLIC BLOOD PRESSURE: 66 MMHG | OXYGEN SATURATION: 99 % | HEART RATE: 79 BPM | SYSTOLIC BLOOD PRESSURE: 124 MMHG

## 2018-06-19 DIAGNOSIS — D72.829 ELEVATED WHITE BLOOD CELL COUNT, UNSPECIFIED: ICD-10-CM

## 2018-06-19 DIAGNOSIS — D50.0 IRON DEFICIENCY ANEMIA SECONDARY TO BLOOD LOSS (CHRONIC): ICD-10-CM

## 2018-06-19 LAB
BUN SERPL-MCNC: 15 MG/DL — SIGNIFICANT CHANGE UP (ref 7–23)
CALCIUM SERPL-MCNC: 8.1 MG/DL — LOW (ref 8.4–10.5)
CHLORIDE SERPL-SCNC: 105 MMOL/L — SIGNIFICANT CHANGE UP (ref 98–107)
CO2 SERPL-SCNC: 22 MMOL/L — SIGNIFICANT CHANGE UP (ref 22–31)
CREAT SERPL-MCNC: 0.87 MG/DL — SIGNIFICANT CHANGE UP (ref 0.5–1.3)
GLUCOSE SERPL-MCNC: 168 MG/DL — HIGH (ref 70–99)
HCT VFR BLD CALC: 33.2 % — LOW (ref 39–50)
HGB BLD-MCNC: 11 G/DL — LOW (ref 13–17)
MCHC RBC-ENTMCNC: 29.5 PG — SIGNIFICANT CHANGE UP (ref 27–34)
MCHC RBC-ENTMCNC: 33.1 % — SIGNIFICANT CHANGE UP (ref 32–36)
MCV RBC AUTO: 89 FL — SIGNIFICANT CHANGE UP (ref 80–100)
NRBC # FLD: 0 — SIGNIFICANT CHANGE UP
PLATELET # BLD AUTO: 209 K/UL — SIGNIFICANT CHANGE UP (ref 150–400)
PMV BLD: 10.4 FL — SIGNIFICANT CHANGE UP (ref 7–13)
POTASSIUM SERPL-MCNC: 4.2 MMOL/L — SIGNIFICANT CHANGE UP (ref 3.5–5.3)
POTASSIUM SERPL-SCNC: 4.2 MMOL/L — SIGNIFICANT CHANGE UP (ref 3.5–5.3)
RBC # BLD: 3.73 M/UL — LOW (ref 4.2–5.8)
RBC # FLD: 13.2 % — SIGNIFICANT CHANGE UP (ref 10.3–14.5)
SODIUM SERPL-SCNC: 137 MMOL/L — SIGNIFICANT CHANGE UP (ref 135–145)
WBC # BLD: 15.94 K/UL — HIGH (ref 3.8–10.5)
WBC # FLD AUTO: 15.94 K/UL — HIGH (ref 3.8–10.5)

## 2018-06-19 PROCEDURE — 99233 SBSQ HOSP IP/OBS HIGH 50: CPT

## 2018-06-19 RX ORDER — TRAMADOL HYDROCHLORIDE 50 MG/1
1 TABLET ORAL
Qty: 21 | Refills: 0
Start: 2018-06-19 | End: 2018-06-25

## 2018-06-19 RX ORDER — ASPIRIN/CALCIUM CARB/MAGNESIUM 324 MG
1 TABLET ORAL
Qty: 84 | Refills: 0
Start: 2018-06-19 | End: 2018-07-30

## 2018-06-19 RX ORDER — LACTOBACILLUS ACIDOPHILUS 100MM CELL
1 CAPSULE ORAL
Qty: 0 | Refills: 0 | COMMUNITY

## 2018-06-19 RX ORDER — SENNA PLUS 8.6 MG/1
2 TABLET ORAL
Qty: 60 | Refills: 0
Start: 2018-06-19 | End: 2018-07-18

## 2018-06-19 RX ORDER — DOCUSATE SODIUM 100 MG
1 CAPSULE ORAL
Qty: 90 | Refills: 0
Start: 2018-06-19 | End: 2018-07-18

## 2018-06-19 RX ORDER — PANTOPRAZOLE SODIUM 20 MG/1
1 TABLET, DELAYED RELEASE ORAL
Qty: 30 | Refills: 0
Start: 2018-06-19 | End: 2018-07-18

## 2018-06-19 RX ORDER — GLUCOSAMINE/CHONDROITIN/C/MANG 500-400 MG
1 CAPSULE ORAL
Qty: 0 | Refills: 0 | COMMUNITY

## 2018-06-19 RX ORDER — GABAPENTIN 400 MG/1
1 CAPSULE ORAL
Qty: 90 | Refills: 0
Start: 2018-06-19 | End: 2018-07-18

## 2018-06-19 RX ORDER — ASPIRIN/CALCIUM CARB/MAGNESIUM 324 MG
1 TABLET ORAL
Qty: 0 | Refills: 0 | COMMUNITY

## 2018-06-19 RX ORDER — MELOXICAM 15 MG/1
1 TABLET ORAL
Qty: 30 | Refills: 0
Start: 2018-06-19 | End: 2018-07-18

## 2018-06-19 RX ADMIN — SODIUM CHLORIDE 3 MILLILITER(S): 9 INJECTION INTRAMUSCULAR; INTRAVENOUS; SUBCUTANEOUS at 05:42

## 2018-06-19 RX ADMIN — LORATADINE 10 MILLIGRAM(S): 10 TABLET ORAL at 12:11

## 2018-06-19 RX ADMIN — PANTOPRAZOLE SODIUM 40 MILLIGRAM(S): 20 TABLET, DELAYED RELEASE ORAL at 12:11

## 2018-06-19 RX ADMIN — Medication 75 MILLIGRAM(S): at 05:53

## 2018-06-19 RX ADMIN — Medication 650 MILLIGRAM(S): at 12:10

## 2018-06-19 RX ADMIN — Medication 100 MILLIGRAM(S): at 14:05

## 2018-06-19 RX ADMIN — SODIUM CHLORIDE 3 MILLILITER(S): 9 INJECTION INTRAMUSCULAR; INTRAVENOUS; SUBCUTANEOUS at 14:06

## 2018-06-19 RX ADMIN — SODIUM CHLORIDE 1000 MILLILITER(S): 9 INJECTION INTRAMUSCULAR; INTRAVENOUS; SUBCUTANEOUS at 05:53

## 2018-06-19 RX ADMIN — Medication 325 MILLIGRAM(S): at 05:53

## 2018-06-19 RX ADMIN — Medication 650 MILLIGRAM(S): at 05:53

## 2018-06-19 RX ADMIN — Medication 100 MILLIGRAM(S): at 05:53

## 2018-06-19 RX ADMIN — Medication 15 MILLIGRAM(S): at 12:11

## 2018-06-19 RX ADMIN — Medication 102 MILLIGRAM(S): at 07:00

## 2018-06-19 RX ADMIN — POLYETHYLENE GLYCOL 3350 17 GRAM(S): 17 POWDER, FOR SOLUTION ORAL at 12:11

## 2018-06-19 NOTE — PROGRESS NOTE ADULT - PROBLEM SELECTOR PLAN 1
s/p L total hip replacement POD#1 doing well  PT/OT  Bowel regimen  ASA 325bid for DVT ppx as per ortho  Pt able to go home today w/ home PT and f/u outpatient

## 2018-06-19 NOTE — PROGRESS NOTE ADULT - ASSESSMENT
53 year old man PMH of osteoarthritis diagnosed 20 years ago and worsening L hip pain presented for scheduled left total hip replacement via direct anterior approach on 6/18/2018. Pt is  POD#1 doing well

## 2018-06-19 NOTE — PROGRESS NOTE ADULT - SUBJECTIVE AND OBJECTIVE BOX
CHIEF COMPLAINT: f/u orthopedic co-management    SUBJECTIVE / OVERNIGHT EVENTS: Patient seen and examined. No acute events overnight. Pain well controlled and patient without any complaints. Reports he has no pain even after ambulation. Walked around this am with PT. Eating well without nausea/vomiting. Urinating well on his own. Passing gas but no BM    MEDICATIONS  (STANDING): reviewed  acetaminophen   Tablet 650 milliGRAM(s) Oral every 6 hours  aspirin enteric coated 325 milliGRAM(s) Oral two times a day  docusate sodium 100 milliGRAM(s) Oral three times a day  lactated ringers. 1000 milliLiter(s) (150 mL/Hr) IV Continuous <Continuous>  loratadine 10 milliGRAM(s) Oral daily  pantoprazole    Tablet 40 milliGRAM(s) Oral daily  polyethylene glycol 3350 17 Gram(s) Oral daily  pregabalin 75 milliGRAM(s) Oral every 12 hours  senna 2 Tablet(s) Oral at bedtime  sodium chloride 0.9% lock flush 3 milliLiter(s) IV Push every 8 hours  traMADol 50 milliGRAM(s) Oral every 8 hours    MEDICATIONS  (PRN):  acetaminophen   Tablet 650 milliGRAM(s) Oral every 6 hours PRN For Temp over 38.3 C (100.94 F)  aluminum hydroxide/magnesium hydroxide/simethicone Suspension 30 milliLiter(s) Oral four times a day PRN Indigestion  HYDROmorphone  Injectable 0.5 milliGRAM(s) IV Push every 4 hours PRN break through pain  ondansetron Injectable 4 milliGRAM(s) IV Push every 6 hours PRN Nausea and/or Vomiting  oxyCODONE    IR 5 milliGRAM(s) Oral every 4 hours PRN mild and moderate pain  oxyCODONE    IR 10 milliGRAM(s) Oral every 4 hours PRN Severe Pain (7 - 10)      VITALS:  T(F): 97.8 (06-19-18 @ 10:11), Max: 99.3 (06-18-18 @ 17:50)  HR: 71 (06-19-18 @ 10:11) (71 - 88)  BP: 128/72 (06-19-18 @ 10:11) (110/59 - 139/81)  RR: 16 (06-19-18 @ 10:11) (16 - 18)  SpO2: 98% (06-19-18 @ 10:11)  Wt(kg): --      CAPILLARY BLOOD GLUCOSE    PHYSICAL EXAM:  GENERAL: NAD, well-developed  HEENT: NCAT, EOMI, conjunctiva and sclera clear  NECK: Supple, No JVD  CHEST/LUNG: Clear to auscultation bilaterally  HEART: S1/ S2, Regular rate and rhythm;   ABDOMEN: Soft, Nontender, Nondistended; Bowel sounds present  EXTREMITIES: L hip dressing c/d/i 2+ Peripheral Pulses, No clubbing, cyanosis, no peripheral edema  PSYCH: AAOx3    LABS: reviewed              11.0                 137  | 22   | 15           15.94 >-----------< 209     ------------------------< 168                   33.2                 4.2  | 105  | 0.87                                         Ca 8.1   Mg x     Ph x        RADIOLOGY & ADDITIONAL TESTS:  Imaging Personally Reviewed: [x] Yes    [ ] Consultant(s) Notes Reviewed:  [x] Care Discussed with Consultants/Other Providers: Orthopedic PA - discussed d/c planning today

## 2018-06-19 NOTE — PROGRESS NOTE ADULT - SUBJECTIVE AND OBJECTIVE BOX
ORTHO PROGRESS NOTE     Pt seen and examined at bedside, denies SOB, CP, Dizziness. N/V/D /HA.  No significant overnight events. Pain well controlled. Patient ambulated  with PT     Vital Signs Last 24 Hrs  T(C): 36.7 (19 Jun 2018 05:50), Max: 37.4 (18 Jun 2018 17:50)  T(F): 98.1 (19 Jun 2018 05:50), Max: 99.3 (18 Jun 2018 17:50)  HR: 77 (19 Jun 2018 05:50) (77 - 96)  BP: 139/81 (19 Jun 2018 05:50) (110/59 - 148/80)  BP(mean): --  RR: 16 (19 Jun 2018 05:50) (11 - 18)  SpO2: 98% (19 Jun 2018 05:50) (96% - 100%)    Gen: No apparent distress    left LE:  Dressing C/D I     BLE: motor intact EHL/FHL/TA/GS .  Sensation is grossly intact to light touch in the distal extremities.  Compartments are soft bilaterally.  Extremities are warm .  DP 2+ BLE     Labs:  CBC Full  -  ( 18 Jun 2018 09:44 )  WBC Count : 10.38 K/uL  Hemoglobin : 13.6 g/dL  Hematocrit : 40.3 %  Platelet Count - Automated : 220 K/uL  Mean Cell Volume : 86.1 fL  Mean Cell Hemoglobin : 29.1 pg  Mean Cell Hemoglobin Concentration : 33.7 %  Auto Neutrophil # : x  Auto Lymphocyte # : x  Auto Monocyte # : x  Auto Eosinophil # : x  Auto Basophil # : x  Auto Neutrophil % : x  Auto Lymphocyte % : x  Auto Monocyte % : x  Auto Eosinophil % : x  Auto Basophil % : x        06-18    141  |  104  |  15  ----------------------------<  124<H>  4.2   |  24  |  1.01    Ca    8.5      18 Jun 2018 09:44           A/P : S/P left MARY  POD #1    - Pain control/ Analgesia  - DVT ppx ASA BID  foot pumps  - PT/OT - wbat/oob    - Incentive Spirometer  - Discharge planning   - notify Ortho for questions

## 2018-06-25 LAB — SURGICAL PATHOLOGY STUDY: SIGNIFICANT CHANGE UP

## 2018-07-06 ENCOUNTER — APPOINTMENT (OUTPATIENT)
Dept: ORTHOPEDIC SURGERY | Facility: CLINIC | Age: 53
End: 2018-07-06
Payer: COMMERCIAL

## 2018-07-06 VITALS
DIASTOLIC BLOOD PRESSURE: 92 MMHG | SYSTOLIC BLOOD PRESSURE: 159 MMHG | WEIGHT: 200 LBS | HEART RATE: 69 BPM | BODY MASS INDEX: 30.31 KG/M2 | HEIGHT: 68 IN

## 2018-07-06 PROCEDURE — 73502 X-RAY EXAM HIP UNI 2-3 VIEWS: CPT | Mod: LT

## 2018-07-06 PROCEDURE — 99024 POSTOP FOLLOW-UP VISIT: CPT

## 2018-08-17 ENCOUNTER — APPOINTMENT (OUTPATIENT)
Dept: ORTHOPEDIC SURGERY | Facility: CLINIC | Age: 53
End: 2018-08-17
Payer: COMMERCIAL

## 2018-08-17 VITALS
HEART RATE: 64 BPM | SYSTOLIC BLOOD PRESSURE: 148 MMHG | BODY MASS INDEX: 30.31 KG/M2 | WEIGHT: 200 LBS | DIASTOLIC BLOOD PRESSURE: 92 MMHG | HEIGHT: 68 IN

## 2018-08-17 PROCEDURE — 99024 POSTOP FOLLOW-UP VISIT: CPT

## 2018-09-18 ENCOUNTER — FORM ENCOUNTER (OUTPATIENT)
Age: 53
End: 2018-09-18

## 2019-04-23 PROBLEM — Z87.438 PERSONAL HISTORY OF OTHER DISEASES OF MALE GENITAL ORGANS: Chronic | Status: ACTIVE | Noted: 2018-05-29

## 2019-04-23 PROBLEM — M19.90 UNSPECIFIED OSTEOARTHRITIS, UNSPECIFIED SITE: Chronic | Status: ACTIVE | Noted: 2018-05-29

## 2019-04-23 PROBLEM — J30.2 OTHER SEASONAL ALLERGIC RHINITIS: Chronic | Status: ACTIVE | Noted: 2018-05-29

## 2019-04-23 PROBLEM — E78.5 HYPERLIPIDEMIA, UNSPECIFIED: Chronic | Status: ACTIVE | Noted: 2018-05-29

## 2019-04-24 ENCOUNTER — TRANSCRIPTION ENCOUNTER (OUTPATIENT)
Age: 54
End: 2019-04-24

## 2019-05-10 ENCOUNTER — APPOINTMENT (OUTPATIENT)
Dept: ORTHOPEDIC SURGERY | Facility: CLINIC | Age: 54
End: 2019-05-10
Payer: COMMERCIAL

## 2019-05-10 DIAGNOSIS — Z96.642 PRESENCE OF LEFT ARTIFICIAL HIP JOINT: ICD-10-CM

## 2019-05-10 DIAGNOSIS — M16.11 UNILATERAL PRIMARY OSTEOARTHRITIS, RIGHT HIP: ICD-10-CM

## 2019-05-10 DIAGNOSIS — M25.859 OTHER SPECIFIED JOINT DISORDERS, UNSPECIFIED HIP: ICD-10-CM

## 2019-05-10 PROCEDURE — 99214 OFFICE O/P EST MOD 30 MIN: CPT

## 2019-05-10 PROCEDURE — 73522 X-RAY EXAM HIPS BI 3-4 VIEWS: CPT

## 2019-05-10 NOTE — HISTORY OF PRESENT ILLNESS
[Worsening] : worsening [de-identified] : Mr. LISA COPE is a 54 year old male presents with right hip pain, present for over one year, now worsening. He is one year status post left total hip replacement, doing well on the left side. He has since been having more discomfort on the right hip.  He localizes the pain to the groin and lateral aspect of the right hip.  The patient describes the pain as dull achy and constant,  and states it is intermittently worsened based on activity. He states the pain is present when walking, climbing stairs, standing from a seated position, and deep flexion of the hip.  He admits to worsening stiffness of the hip, which is now interfering with ADLs such as dressing and toe nail care. He has been taking NSAIDs for pain with minimal relief. He admits to prior conservative management inclusive of physical therapy, and  antiinflammatories, with continued pain.  He admits to prior diagnosis of herniated disc, and lower back pain, and denies numbness and tingling of the lower extremities. The patient admits to limitations in his  quality of life, and is present to discuss options for treatment. HE would like to consider total hip replacement on the right hip, as he has had a great result on the left side. \par  [7] : a current pain level of 7/10 [Constant] : ~He/She~ states the symptoms seem to be constant [Rest] : relieved by rest

## 2019-05-10 NOTE — DISCUSSION/SUMMARY
[de-identified] : Right hip advanced degenerative joint disease secondary to EBENEZER, worsening, with stable left total hip replacement. \par The natural history and treatment of degenerative arthritis was discussed with the patient at length today. The spectrum of treatment including nonoperative modalities to surgical intervention was elucidated. Noninvasive and nonoperative treatment modalities include weight reduction, activity modification with low impact exercise,  as needed use of acetaminophen or anti-inflammatory medications if tolerated, glucosamine/chondroitin supplements, and physical therapy. Further treatments can include corticosteroid injection and the use of viscosupplementation with hyaluronic acid injections. Definitive surgical treatment can certainly include total joint arthroplasty also. The risks and benefits of each treatment options was discussed and all questions were answered.\par In view of lack of adequate pain relief with conservative (non-surgical) management protocol including physical therapy, home exercises, weight loss, activity modification, NSAIDS; the patient is recommended to consider a RIGHT Total Hip Replacement with anterior approach\par The risks, benefits, alternatives, implications, complications including but not limited to pain, stiffness, bleeding, limp, wound breakdown, infection, limb length discrepancy, dislocation, bone fracture, nerve and vascular compromise, implant wear and durability issues and rehabilitation were discussed and relevant questions were addressed. The possibility of recurrent pain, no improvement in pain and actual worsening of the pain were also mentioned in conversation with the patient. Medical complications related to the patient's general medical health including deep vein thrombosis, pulmonary embolus, heart attack, stroke, death and other complications from anesthesia were discussed as well. The patient wishes to proceed and will undergo preoperative medical evaluation and clearance, attend the preoperative educational class and will schedule surgery appropriately.\par Anticoagulation prophylaxis medication options to address risks of deep vein thrombosis and pulmonary embolism were discussed and weighed against the risks of bleeding and wound healing complications. The patient elected aspirin/coumadin prophylaxis with mechanical modalities.\par

## 2019-05-10 NOTE — REASON FOR VISIT
[Follow-Up Visit] : a follow-up visit for [FreeTextEntry2] : s/p left total hip replacement 6/18/2018, right hip pain

## 2019-05-10 NOTE — CONSULT LETTER
[Dear  ___] : Dear  [unfilled], [Consult Closing:] : Thank you very much for allowing me to participate in the care of this patient.  If you have any questions, please do not hesitate to contact me. [Consult Letter:] : I had the pleasure of evaluating your patient, [unfilled]. [Please see my note below.] : Please see my note below. [FreeTextEntry2] : CONNER NAGY\par  [FreeTextEntry3] : Darian Vega MD\par \par ______________________________________________\par [facility]: Hip/Knee Arthroplasty\par [address], [address], [place] [address]\par (t) [phone]\par (f) [phone]\par  [Sincerely,] : Sincerely,

## 2019-05-10 NOTE — PHYSICAL EXAM
[de-identified] : On general examination the patient is adequately groomed and nourished. The vital parameters are as recorded. \par There is no lymphedema or diffuse swelling, no varicose veins, no skin warmth/erythema/scars/swelling, no ulcers and no palpable lymph nodes or masses in both lower extremities. Bilateral pedal pulses are well palpable.\par Upper Extremity:\par Both right and left upper extremities are unremarkable in terms of skin rash, lesions, pigmentation, redness, tenderness, swelling, joint instability, abnormal deformity or crepitus. The overall range of motion, sensation, motor tone and strength testing are normal.\par \par Hip Exam:\par The gait is right stiff hip coxalgic.\par The patient has unequal leg lengths - right lower limb shortening of 0.5 cm and no pelvic tilt. \par Dallin/Kevin test is 12 inches on the right and 6 inches on the left. \par Active SLR is 40 degrees on the right and 60 degrees on the left. The left hip demonstrates well healed scar, the right hip demonstrates no scars and the skin has no signs of inflammation or tenderness. \par Both Hips have a range of motion that is symmetrical in flexion and extension of:\par Hip flexion:             Right 60 degrees                Left 100 degrees\par Hip abduction:      Right 20 degrees                  Left 40 degrees\par Hip adduction:      Right 10 degrees                    Left 20 degrees\par Internal rotation:      Right 10 degrees                   Left 20 degrees\par External rotation:    Right 20 degrees                  Left 40 degrees\par There is no flexion contracture, deformity or instability. \par Labral impingement tests are negative.\par Right hip flexor, abductor and extensor power is grade 4+.\par Left hip flexor, abductor and extensor power is grade 5.\par \par Spine Exam:\par There is mild curvature of the spine with loss of normal lumbar lordosis. The skin is devoid of erythema, scars, pigmentation or rashes. There is mild lumbar spasm and tenderness especially at L4-L5 or L5-S1. \par The range of motion of the lumbar spine is limited by pain and spasm. Forward flexion is 80% normal, extension catch positive, lateral flexion and rotation 80% normal. There is no lumbar spine imbalance or instability detected.\par Bilateral passive SLR is right 40 degrees, left 40 degrees in supine and sitting positions. Lasegue's Test is negative.\par Neurology:\par The patient is alert and oriented in person, place and time. The mood is calm and affect is normal.\par Testing for coordination including Rhomberg's Test and Finger-Nose Test, sensation, motor tone and power and deep tendon reflexes in both lower extremities is normal.\par  [de-identified] : The following radiographs were ordered and read by me during this patients visit. I reviewed each radiograph in detail with the patient and discussed the findings as highlighted below. \par AP and false profile views of the right hip and AP view of the pelvis confirm advanced degenerative joint disease with joint space collapse osteophyte and cyst formation, with EBENEZER. There is a stable left total hip replacement with no signs of mechanical failure or periprosthetic fracture. \par \par

## 2019-06-04 ENCOUNTER — OUTPATIENT (OUTPATIENT)
Dept: OUTPATIENT SERVICES | Facility: HOSPITAL | Age: 54
LOS: 1 days | End: 2019-06-04
Payer: COMMERCIAL

## 2019-06-04 VITALS
HEART RATE: 68 BPM | WEIGHT: 223.99 LBS | SYSTOLIC BLOOD PRESSURE: 140 MMHG | RESPIRATION RATE: 16 BRPM | DIASTOLIC BLOOD PRESSURE: 84 MMHG | TEMPERATURE: 98 F | HEIGHT: 67.5 IN

## 2019-06-04 DIAGNOSIS — M16.11 UNILATERAL PRIMARY OSTEOARTHRITIS, RIGHT HIP: ICD-10-CM

## 2019-06-04 DIAGNOSIS — Z96.641 PRESENCE OF RIGHT ARTIFICIAL HIP JOINT: Chronic | ICD-10-CM

## 2019-06-04 DIAGNOSIS — Z96.642 PRESENCE OF LEFT ARTIFICIAL HIP JOINT: Chronic | ICD-10-CM

## 2019-06-04 DIAGNOSIS — M19.90 UNSPECIFIED OSTEOARTHRITIS, UNSPECIFIED SITE: ICD-10-CM

## 2019-06-04 LAB
ANION GAP SERPL CALC-SCNC: 12 MMO/L — SIGNIFICANT CHANGE UP (ref 7–14)
APPEARANCE UR: CLEAR — SIGNIFICANT CHANGE UP
BILIRUB UR-MCNC: NEGATIVE — SIGNIFICANT CHANGE UP
BLD GP AB SCN SERPL QL: NEGATIVE — SIGNIFICANT CHANGE UP
BLOOD UR QL VISUAL: NEGATIVE — SIGNIFICANT CHANGE UP
BUN SERPL-MCNC: 23 MG/DL — SIGNIFICANT CHANGE UP (ref 7–23)
CALCIUM SERPL-MCNC: 9.5 MG/DL — SIGNIFICANT CHANGE UP (ref 8.4–10.5)
CHLORIDE SERPL-SCNC: 101 MMOL/L — SIGNIFICANT CHANGE UP (ref 98–107)
CO2 SERPL-SCNC: 25 MMOL/L — SIGNIFICANT CHANGE UP (ref 22–31)
COLOR SPEC: YELLOW — SIGNIFICANT CHANGE UP
CREAT SERPL-MCNC: 0.93 MG/DL — SIGNIFICANT CHANGE UP (ref 0.5–1.3)
GLUCOSE SERPL-MCNC: 98 MG/DL — SIGNIFICANT CHANGE UP (ref 70–99)
GLUCOSE UR-MCNC: NEGATIVE — SIGNIFICANT CHANGE UP
HBA1C BLD-MCNC: 5.4 % — SIGNIFICANT CHANGE UP (ref 4–5.6)
HCT VFR BLD CALC: 46.3 % — SIGNIFICANT CHANGE UP (ref 39–50)
HGB BLD-MCNC: 15.3 G/DL — SIGNIFICANT CHANGE UP (ref 13–17)
KETONES UR-MCNC: NEGATIVE — SIGNIFICANT CHANGE UP
LEUKOCYTE ESTERASE UR-ACNC: NEGATIVE — SIGNIFICANT CHANGE UP
MAGNESIUM SERPL-MCNC: 2 MG/DL — SIGNIFICANT CHANGE UP (ref 1.6–2.6)
MCHC RBC-ENTMCNC: 29.4 PG — SIGNIFICANT CHANGE UP (ref 27–34)
MCHC RBC-ENTMCNC: 33 % — SIGNIFICANT CHANGE UP (ref 32–36)
MCV RBC AUTO: 88.9 FL — SIGNIFICANT CHANGE UP (ref 80–100)
NITRITE UR-MCNC: NEGATIVE — SIGNIFICANT CHANGE UP
NRBC # FLD: 0 K/UL — SIGNIFICANT CHANGE UP (ref 0–0)
PH UR: 6.5 — SIGNIFICANT CHANGE UP (ref 5–8)
PHOSPHATE SERPL-MCNC: 2.4 MG/DL — LOW (ref 2.5–4.5)
PLATELET # BLD AUTO: 285 K/UL — SIGNIFICANT CHANGE UP (ref 150–400)
PMV BLD: 9.7 FL — SIGNIFICANT CHANGE UP (ref 7–13)
POTASSIUM SERPL-MCNC: 4.2 MMOL/L — SIGNIFICANT CHANGE UP (ref 3.5–5.3)
POTASSIUM SERPL-SCNC: 4.2 MMOL/L — SIGNIFICANT CHANGE UP (ref 3.5–5.3)
PROT UR-MCNC: NEGATIVE — SIGNIFICANT CHANGE UP
RBC # BLD: 5.21 M/UL — SIGNIFICANT CHANGE UP (ref 4.2–5.8)
RBC # FLD: 13.6 % — SIGNIFICANT CHANGE UP (ref 10.3–14.5)
RH IG SCN BLD-IMP: POSITIVE — SIGNIFICANT CHANGE UP
SODIUM SERPL-SCNC: 138 MMOL/L — SIGNIFICANT CHANGE UP (ref 135–145)
SP GR SPEC: 1.02 — SIGNIFICANT CHANGE UP (ref 1–1.04)
UROBILINOGEN FLD QL: NORMAL — SIGNIFICANT CHANGE UP
WBC # BLD: 7.14 K/UL — SIGNIFICANT CHANGE UP (ref 3.8–10.5)
WBC # FLD AUTO: 7.14 K/UL — SIGNIFICANT CHANGE UP (ref 3.8–10.5)

## 2019-06-04 PROCEDURE — 93010 ELECTROCARDIOGRAM REPORT: CPT

## 2019-06-04 RX ORDER — NIACIN 50 MG
1 TABLET ORAL
Qty: 0 | Refills: 0 | DISCHARGE

## 2019-06-04 RX ORDER — SODIUM CHLORIDE 9 MG/ML
3 INJECTION INTRAMUSCULAR; INTRAVENOUS; SUBCUTANEOUS EVERY 8 HOURS
Refills: 0 | Status: DISCONTINUED | OUTPATIENT
Start: 2019-06-25 | End: 2019-06-26

## 2019-06-04 RX ORDER — CETIRIZINE HYDROCHLORIDE 10 MG/1
1 TABLET ORAL
Qty: 0 | Refills: 0 | DISCHARGE

## 2019-06-04 NOTE — H&P PST ADULT - HISTORY OF PRESENT ILLNESS
53y/o male scheduled for right total hip replacement direct anterior approach on 6/25/19 .  Pt states, "right hip pain for the past 20 yrs, pain worse over the past 5 yrs.  Xray shows bone on bone. Dx with osteoarthritis 20 yrs ago - pt had left hip surgery 2018 with good result. 55y/o male scheduled for right total hip replacement direct anterior approach on 6/25/19 .  Pt states he had left hip done last year with good results and has decided to have right hip done at this time- c/o of occasional right hip pain but denies need for support or weakness.

## 2019-06-04 NOTE — H&P PST ADULT - NEGATIVE ENMT SYMPTOMS
no hearing difficulty/no vertigo/no sinus symptoms/no ear pain/no tinnitus/no throat pain/no dysphagia

## 2019-06-04 NOTE — H&P PST ADULT - LOCATION OF BACK PAIN
0ccasional lower back pain with rare pain radiating to right leg - pt denies complications post op left hip replacement/lumbar spine

## 2019-06-04 NOTE — H&P PST ADULT - NEUROLOGICAL DETAILS
responds to pain/responds to verbal commands/sensation intact/normal strength/alert and oriented x 3

## 2019-06-04 NOTE — H&P PST ADULT - NSICDXPROBLEM_GEN_ALL_CORE_FT
PROBLEM DIAGNOSES  Problem: Osteoarthrosis, localized  Assessment and Plan: Pt scheduled for surgery and preop instructions including for Famotidine and Chlorhexidine use in showering on the day of surgery given via teach back method and patient verbalized understanding of instructions.  OR Booking notified of risk for sleep apnea  and right hip joint . Pt to take last dose of ASA 6/17/19.   Pt to see PCP for MC as per surgeon

## 2019-06-04 NOTE — H&P PST ADULT - ACTIVITY
walks daily, 1 hour daily on ellyptical , able to climb a flight of stairs walks daily, 1 hour daily on elliptical , able to climb a flight of stairs

## 2019-06-04 NOTE — H&P PST ADULT - MUSCULOSKELETAL COMMENTS
Pt c/o of right hip pain with certain movements and has some restriction to ROM Hx of left hip replacement 2018 with good results - pt now to have right side done and c/o of mild discomfort with walking and at night

## 2019-06-04 NOTE — H&P PST ADULT - MARITAL STATUS
3 children, mother  65y/o colon and lung cancer, father  72 heart disease, RA, 4 siblings 2 brothers hx of prostate cancer, 1 brother with cardiac stents/

## 2019-06-04 NOTE — H&P PST ADULT - NEGATIVE NEUROLOGICAL SYMPTOMS
no paresthesias/no generalized seizures/no weakness/no syncope/no tremors/no transient paralysis/no focal seizures

## 2019-06-06 LAB — BACTERIA NPH CULT: SIGNIFICANT CHANGE UP

## 2019-06-18 ENCOUNTER — OTHER (OUTPATIENT)
Age: 54
End: 2019-06-18

## 2019-06-24 ENCOUNTER — TRANSCRIPTION ENCOUNTER (OUTPATIENT)
Age: 54
End: 2019-06-24

## 2019-06-25 ENCOUNTER — APPOINTMENT (OUTPATIENT)
Dept: ORTHOPEDIC SURGERY | Facility: HOSPITAL | Age: 54
End: 2019-06-25

## 2019-06-25 ENCOUNTER — TRANSCRIPTION ENCOUNTER (OUTPATIENT)
Age: 54
End: 2019-06-25

## 2019-06-25 ENCOUNTER — INPATIENT (INPATIENT)
Facility: HOSPITAL | Age: 54
LOS: 0 days | Discharge: HOME HEALTH | End: 2019-06-26
Attending: ORTHOPAEDIC SURGERY | Admitting: ORTHOPAEDIC SURGERY
Payer: COMMERCIAL

## 2019-06-25 VITALS
DIASTOLIC BLOOD PRESSURE: 90 MMHG | OXYGEN SATURATION: 96 % | TEMPERATURE: 99 F | HEIGHT: 67 IN | WEIGHT: 223.99 LBS | HEART RATE: 73 BPM | SYSTOLIC BLOOD PRESSURE: 156 MMHG | RESPIRATION RATE: 16 BRPM

## 2019-06-25 DIAGNOSIS — M19.91 PRIMARY OSTEOARTHRITIS, UNSPECIFIED SITE: ICD-10-CM

## 2019-06-25 DIAGNOSIS — Z79.899 OTHER LONG TERM (CURRENT) DRUG THERAPY: ICD-10-CM

## 2019-06-25 DIAGNOSIS — Z29.9 ENCOUNTER FOR PROPHYLACTIC MEASURES, UNSPECIFIED: ICD-10-CM

## 2019-06-25 DIAGNOSIS — M16.11 UNILATERAL PRIMARY OSTEOARTHRITIS, RIGHT HIP: ICD-10-CM

## 2019-06-25 DIAGNOSIS — Z96.642 PRESENCE OF LEFT ARTIFICIAL HIP JOINT: Chronic | ICD-10-CM

## 2019-06-25 LAB
ANION GAP SERPL CALC-SCNC: 9 MMO/L — SIGNIFICANT CHANGE UP (ref 7–14)
BUN SERPL-MCNC: 18 MG/DL — SIGNIFICANT CHANGE UP (ref 7–23)
CALCIUM SERPL-MCNC: 9.1 MG/DL — SIGNIFICANT CHANGE UP (ref 8.4–10.5)
CHLORIDE SERPL-SCNC: 104 MMOL/L — SIGNIFICANT CHANGE UP (ref 98–107)
CO2 SERPL-SCNC: 26 MMOL/L — SIGNIFICANT CHANGE UP (ref 22–31)
CREAT SERPL-MCNC: 1.17 MG/DL — SIGNIFICANT CHANGE UP (ref 0.5–1.3)
GLUCOSE BLDC GLUCOMTR-MCNC: 94 MG/DL — SIGNIFICANT CHANGE UP (ref 70–99)
GLUCOSE SERPL-MCNC: 128 MG/DL — HIGH (ref 70–99)
HCT VFR BLD CALC: 44.3 % — SIGNIFICANT CHANGE UP (ref 39–50)
HGB BLD-MCNC: 14.7 G/DL — SIGNIFICANT CHANGE UP (ref 13–17)
MCHC RBC-ENTMCNC: 29.5 PG — SIGNIFICANT CHANGE UP (ref 27–34)
MCHC RBC-ENTMCNC: 33.2 % — SIGNIFICANT CHANGE UP (ref 32–36)
MCV RBC AUTO: 88.8 FL — SIGNIFICANT CHANGE UP (ref 80–100)
NRBC # FLD: 0 K/UL — SIGNIFICANT CHANGE UP (ref 0–0)
PLATELET # BLD AUTO: 248 K/UL — SIGNIFICANT CHANGE UP (ref 150–400)
PMV BLD: 9.3 FL — SIGNIFICANT CHANGE UP (ref 7–13)
POTASSIUM SERPL-MCNC: 4.6 MMOL/L — SIGNIFICANT CHANGE UP (ref 3.5–5.3)
POTASSIUM SERPL-SCNC: 4.6 MMOL/L — SIGNIFICANT CHANGE UP (ref 3.5–5.3)
RBC # BLD: 4.99 M/UL — SIGNIFICANT CHANGE UP (ref 4.2–5.8)
RBC # FLD: 13.2 % — SIGNIFICANT CHANGE UP (ref 10.3–14.5)
SODIUM SERPL-SCNC: 139 MMOL/L — SIGNIFICANT CHANGE UP (ref 135–145)
WBC # BLD: 9.56 K/UL — SIGNIFICANT CHANGE UP (ref 3.8–10.5)
WBC # FLD AUTO: 9.56 K/UL — SIGNIFICANT CHANGE UP (ref 3.8–10.5)

## 2019-06-25 PROCEDURE — 99223 1ST HOSP IP/OBS HIGH 75: CPT

## 2019-06-25 PROCEDURE — 72170 X-RAY EXAM OF PELVIS: CPT | Mod: 26

## 2019-06-25 PROCEDURE — 27130 TOTAL HIP ARTHROPLASTY: CPT | Mod: RT

## 2019-06-25 RX ORDER — HYDROMORPHONE HYDROCHLORIDE 2 MG/ML
0.5 INJECTION INTRAMUSCULAR; INTRAVENOUS; SUBCUTANEOUS
Refills: 0 | Status: DISCONTINUED | OUTPATIENT
Start: 2019-06-25 | End: 2019-06-25

## 2019-06-25 RX ORDER — DEXAMETHASONE 0.5 MG/5ML
10 ELIXIR ORAL ONCE
Refills: 0 | Status: COMPLETED | OUTPATIENT
Start: 2019-06-25 | End: 2019-06-25

## 2019-06-25 RX ORDER — SODIUM CHLORIDE 9 MG/ML
1000 INJECTION, SOLUTION INTRAVENOUS
Refills: 0 | Status: DISCONTINUED | OUTPATIENT
Start: 2019-06-25 | End: 2019-06-26

## 2019-06-25 RX ORDER — OMEGA-3 ACID ETHYL ESTERS 1 G
1 CAPSULE ORAL
Qty: 0 | Refills: 0 | DISCHARGE

## 2019-06-25 RX ORDER — GABAPENTIN 400 MG/1
100 CAPSULE ORAL EVERY 8 HOURS
Refills: 0 | Status: DISCONTINUED | OUTPATIENT
Start: 2019-06-25 | End: 2019-06-26

## 2019-06-25 RX ORDER — PANTOPRAZOLE SODIUM 20 MG/1
40 TABLET, DELAYED RELEASE ORAL
Refills: 0 | Status: DISCONTINUED | OUTPATIENT
Start: 2019-06-25 | End: 2019-06-26

## 2019-06-25 RX ORDER — GLUCOSAMINE/MSM/CHONDROITIN A 750-375MG
0 TABLET ORAL
Qty: 0 | Refills: 0 | DISCHARGE

## 2019-06-25 RX ORDER — TRAMADOL HYDROCHLORIDE 50 MG/1
50 TABLET ORAL EVERY 8 HOURS
Refills: 0 | Status: DISCONTINUED | OUTPATIENT
Start: 2019-06-25 | End: 2019-06-25

## 2019-06-25 RX ORDER — LACTOBACILLUS ACIDOPHILUS 100MM CELL
2 CAPSULE ORAL
Qty: 0 | Refills: 0 | DISCHARGE

## 2019-06-25 RX ORDER — ASCORBIC ACID 60 MG
1 TABLET,CHEWABLE ORAL
Qty: 0 | Refills: 0 | DISCHARGE

## 2019-06-25 RX ORDER — OXYCODONE HYDROCHLORIDE 5 MG/1
2 TABLET ORAL
Qty: 84 | Refills: 0
Start: 2019-06-25 | End: 2019-07-01

## 2019-06-25 RX ORDER — FENTANYL CITRATE 50 UG/ML
25 INJECTION INTRAVENOUS
Refills: 0 | Status: DISCONTINUED | OUTPATIENT
Start: 2019-06-25 | End: 2019-06-25

## 2019-06-25 RX ORDER — ONDANSETRON 8 MG/1
4 TABLET, FILM COATED ORAL EVERY 6 HOURS
Refills: 0 | Status: DISCONTINUED | OUTPATIENT
Start: 2019-06-25 | End: 2019-06-26

## 2019-06-25 RX ORDER — ONDANSETRON 8 MG/1
4 TABLET, FILM COATED ORAL ONCE
Refills: 0 | Status: DISCONTINUED | OUTPATIENT
Start: 2019-06-25 | End: 2019-06-25

## 2019-06-25 RX ORDER — ASPIRIN/CALCIUM CARB/MAGNESIUM 324 MG
1 TABLET ORAL
Qty: 84 | Refills: 0
Start: 2019-06-25 | End: 2019-08-05

## 2019-06-25 RX ORDER — VITAMIN E 100 UNIT
1 CAPSULE ORAL
Qty: 0 | Refills: 0 | DISCHARGE

## 2019-06-25 RX ORDER — OXYCODONE HYDROCHLORIDE 5 MG/1
10 TABLET ORAL EVERY 4 HOURS
Refills: 0 | Status: DISCONTINUED | OUTPATIENT
Start: 2019-06-25 | End: 2019-06-26

## 2019-06-25 RX ORDER — GABAPENTIN 400 MG/1
1 CAPSULE ORAL
Qty: 90 | Refills: 0
Start: 2019-06-25 | End: 2019-07-24

## 2019-06-25 RX ORDER — ASPIRIN/CALCIUM CARB/MAGNESIUM 324 MG
325 TABLET ORAL
Refills: 0 | Status: DISCONTINUED | OUTPATIENT
Start: 2019-06-25 | End: 2019-06-26

## 2019-06-25 RX ORDER — OXYCODONE HYDROCHLORIDE 5 MG/1
5 TABLET ORAL EVERY 4 HOURS
Refills: 0 | Status: DISCONTINUED | OUTPATIENT
Start: 2019-06-25 | End: 2019-06-26

## 2019-06-25 RX ORDER — DEXAMETHASONE 0.5 MG/5ML
10 ELIXIR ORAL ONCE
Refills: 0 | Status: COMPLETED | OUTPATIENT
Start: 2019-06-26 | End: 2019-06-26

## 2019-06-25 RX ORDER — SENNA PLUS 8.6 MG/1
2 TABLET ORAL
Qty: 60 | Refills: 0
Start: 2019-06-25 | End: 2019-07-24

## 2019-06-25 RX ORDER — TRAMADOL HYDROCHLORIDE 50 MG/1
50 TABLET ORAL ONCE
Refills: 0 | Status: DISCONTINUED | OUTPATIENT
Start: 2019-06-25 | End: 2019-06-25

## 2019-06-25 RX ORDER — ACETAMINOPHEN 500 MG
975 TABLET ORAL EVERY 8 HOURS
Refills: 0 | Status: DISCONTINUED | OUTPATIENT
Start: 2019-06-25 | End: 2019-06-26

## 2019-06-25 RX ORDER — SODIUM CHLORIDE 9 MG/ML
1000 INJECTION INTRAMUSCULAR; INTRAVENOUS; SUBCUTANEOUS ONCE
Refills: 0 | Status: COMPLETED | OUTPATIENT
Start: 2019-06-25 | End: 2019-06-25

## 2019-06-25 RX ORDER — HYDROMORPHONE HYDROCHLORIDE 2 MG/ML
0.5 INJECTION INTRAMUSCULAR; INTRAVENOUS; SUBCUTANEOUS EVERY 4 HOURS
Refills: 0 | Status: DISCONTINUED | OUTPATIENT
Start: 2019-06-25 | End: 2019-06-26

## 2019-06-25 RX ORDER — SENNA PLUS 8.6 MG/1
2 TABLET ORAL AT BEDTIME
Refills: 0 | Status: DISCONTINUED | OUTPATIENT
Start: 2019-06-25 | End: 2019-06-26

## 2019-06-25 RX ORDER — SODIUM CHLORIDE 9 MG/ML
1000 INJECTION INTRAMUSCULAR; INTRAVENOUS; SUBCUTANEOUS ONCE
Refills: 0 | Status: COMPLETED | OUTPATIENT
Start: 2019-06-26 | End: 2019-06-26

## 2019-06-25 RX ORDER — CHOLECALCIFEROL (VITAMIN D3) 125 MCG
1 CAPSULE ORAL
Qty: 0 | Refills: 0 | DISCHARGE

## 2019-06-25 RX ORDER — CEFAZOLIN SODIUM 1 G
2000 VIAL (EA) INJECTION EVERY 8 HOURS
Refills: 0 | Status: COMPLETED | OUTPATIENT
Start: 2019-06-25 | End: 2019-06-26

## 2019-06-25 RX ORDER — POLYETHYLENE GLYCOL 3350 17 G/17G
17 POWDER, FOR SOLUTION ORAL DAILY
Refills: 0 | Status: DISCONTINUED | OUTPATIENT
Start: 2019-06-25 | End: 2019-06-26

## 2019-06-25 RX ORDER — DOCUSATE SODIUM 100 MG
1 CAPSULE ORAL
Qty: 90 | Refills: 0
Start: 2019-06-25 | End: 2019-07-24

## 2019-06-25 RX ORDER — TRAMADOL HYDROCHLORIDE 50 MG/1
1 TABLET ORAL
Qty: 21 | Refills: 0
Start: 2019-06-25 | End: 2019-07-01

## 2019-06-25 RX ORDER — DOCUSATE SODIUM 100 MG
100 CAPSULE ORAL THREE TIMES A DAY
Refills: 0 | Status: DISCONTINUED | OUTPATIENT
Start: 2019-06-25 | End: 2019-06-26

## 2019-06-25 RX ORDER — PANTOPRAZOLE SODIUM 20 MG/1
40 TABLET, DELAYED RELEASE ORAL ONCE
Refills: 0 | Status: COMPLETED | OUTPATIENT
Start: 2019-06-25 | End: 2019-06-25

## 2019-06-25 RX ORDER — PANTOPRAZOLE SODIUM 20 MG/1
1 TABLET, DELAYED RELEASE ORAL
Qty: 45 | Refills: 0
Start: 2019-06-25 | End: 2019-08-08

## 2019-06-25 RX ORDER — NIACIN 50 MG
500 TABLET ORAL AT BEDTIME
Refills: 0 | Status: DISCONTINUED | OUTPATIENT
Start: 2019-06-25 | End: 2019-06-26

## 2019-06-25 RX ORDER — ACETAMINOPHEN 500 MG
650 TABLET ORAL EVERY 6 HOURS
Refills: 0 | Status: DISCONTINUED | OUTPATIENT
Start: 2019-06-25 | End: 2019-06-26

## 2019-06-25 RX ORDER — KETOROLAC TROMETHAMINE 30 MG/ML
15 SYRINGE (ML) INJECTION EVERY 6 HOURS
Refills: 0 | Status: DISCONTINUED | OUTPATIENT
Start: 2019-06-25 | End: 2019-06-26

## 2019-06-25 RX ORDER — ASPIRIN/CALCIUM CARB/MAGNESIUM 324 MG
1 TABLET ORAL
Qty: 0 | Refills: 0 | DISCHARGE

## 2019-06-25 RX ORDER — SODIUM CHLORIDE 9 MG/ML
1000 INJECTION, SOLUTION INTRAVENOUS
Refills: 0 | Status: DISCONTINUED | OUTPATIENT
Start: 2019-06-25 | End: 2019-06-25

## 2019-06-25 RX ORDER — ACETAMINOPHEN 500 MG
3 TABLET ORAL
Qty: 84 | Refills: 3
Start: 2019-06-25 | End: 2019-07-22

## 2019-06-25 RX ORDER — MILK THISTLE 150 MG
1 CAPSULE ORAL
Qty: 0 | Refills: 0 | DISCHARGE

## 2019-06-25 RX ORDER — KETOROLAC TROMETHAMINE 30 MG/ML
15 SYRINGE (ML) INJECTION EVERY 8 HOURS
Refills: 0 | Status: DISCONTINUED | OUTPATIENT
Start: 2019-06-25 | End: 2019-06-25

## 2019-06-25 RX ORDER — ACETAMINOPHEN 500 MG
975 TABLET ORAL ONCE
Refills: 0 | Status: COMPLETED | OUTPATIENT
Start: 2019-06-25 | End: 2019-06-25

## 2019-06-25 RX ORDER — TRAMADOL HYDROCHLORIDE 50 MG/1
50 TABLET ORAL EVERY 8 HOURS
Refills: 0 | Status: DISCONTINUED | OUTPATIENT
Start: 2019-06-25 | End: 2019-06-26

## 2019-06-25 RX ORDER — FENTANYL CITRATE 50 UG/ML
50 INJECTION INTRAVENOUS
Refills: 0 | Status: DISCONTINUED | OUTPATIENT
Start: 2019-06-25 | End: 2019-06-25

## 2019-06-25 RX ADMIN — Medication 500 MILLIGRAM(S): at 21:36

## 2019-06-25 RX ADMIN — Medication 325 MILLIGRAM(S): at 19:15

## 2019-06-25 RX ADMIN — Medication 100 MILLIGRAM(S): at 15:35

## 2019-06-25 RX ADMIN — SODIUM CHLORIDE 30 MILLILITER(S): 9 INJECTION, SOLUTION INTRAVENOUS at 07:15

## 2019-06-25 RX ADMIN — Medication 975 MILLIGRAM(S): at 07:12

## 2019-06-25 RX ADMIN — Medication 100 MILLIGRAM(S): at 21:36

## 2019-06-25 RX ADMIN — GABAPENTIN 100 MILLIGRAM(S): 400 CAPSULE ORAL at 14:16

## 2019-06-25 RX ADMIN — Medication 150 MILLIGRAM(S): at 07:12

## 2019-06-25 RX ADMIN — Medication 15 MILLIGRAM(S): at 19:30

## 2019-06-25 RX ADMIN — PANTOPRAZOLE SODIUM 40 MILLIGRAM(S): 20 TABLET, DELAYED RELEASE ORAL at 07:12

## 2019-06-25 RX ADMIN — TRAMADOL HYDROCHLORIDE 50 MILLIGRAM(S): 50 TABLET ORAL at 07:11

## 2019-06-25 RX ADMIN — SODIUM CHLORIDE 150 MILLILITER(S): 9 INJECTION, SOLUTION INTRAVENOUS at 12:06

## 2019-06-25 RX ADMIN — Medication 102 MILLIGRAM(S): at 19:13

## 2019-06-25 RX ADMIN — TRAMADOL HYDROCHLORIDE 50 MILLIGRAM(S): 50 TABLET ORAL at 21:36

## 2019-06-25 RX ADMIN — SODIUM CHLORIDE 3 MILLILITER(S): 9 INJECTION INTRAMUSCULAR; INTRAVENOUS; SUBCUTANEOUS at 21:32

## 2019-06-25 RX ADMIN — Medication 975 MILLIGRAM(S): at 14:17

## 2019-06-25 RX ADMIN — Medication 15 MILLIGRAM(S): at 19:15

## 2019-06-25 RX ADMIN — TRAMADOL HYDROCHLORIDE 50 MILLIGRAM(S): 50 TABLET ORAL at 14:19

## 2019-06-25 RX ADMIN — SODIUM CHLORIDE 1000 MILLILITER(S): 9 INJECTION INTRAMUSCULAR; INTRAVENOUS; SUBCUTANEOUS at 12:06

## 2019-06-25 RX ADMIN — SENNA PLUS 2 TABLET(S): 8.6 TABLET ORAL at 21:36

## 2019-06-25 RX ADMIN — Medication 975 MILLIGRAM(S): at 21:36

## 2019-06-25 RX ADMIN — Medication 100 MILLIGRAM(S): at 14:16

## 2019-06-25 RX ADMIN — SODIUM CHLORIDE 150 MILLILITER(S): 9 INJECTION, SOLUTION INTRAVENOUS at 21:36

## 2019-06-25 RX ADMIN — GABAPENTIN 100 MILLIGRAM(S): 400 CAPSULE ORAL at 21:36

## 2019-06-25 NOTE — PROGRESS NOTE ADULT - SUBJECTIVE AND OBJECTIVE BOX
Ortho POC Note  LISA COPE   MRN-[id number]    54yMale is s/p elective Right total hip arthroplasty POD#zero w/out any c/o.  Pt adds he had the other side done last year.  Resting comfortably, NAD, ANO x 3, pt's wife at bedside    Vital Signs Last 24 Hrs  T(C): 36.4 (25 Jun 2019 09:30), Max: 37.2 (25 Jun 2019 05:45)  T(F): 97.5 (25 Jun 2019 09:30), Max: 98.9 (25 Jun 2019 05:45)  HR: 81 (25 Jun 2019 11:15) (73 - 97)  BP: 121/76 (25 Jun 2019 11:15) (115/78 - 156/90)  BP(mean): 87 (25 Jun 2019 11:15) (84 - 91)  RR: 16 (25 Jun 2019 11:15) (10 - 18)  SpO2: 95% (25 Jun 2019 11:15) (93% - 98%)  No Known Allergies      S/P R MARY  R hip wound postop outer dressing is C/D/I  abd pillow in place  motor B/L EHL, TA, GS intact  sensation BLE intact to light touch                          14.7   9.56  )-----------( 248      ( 25 Jun 2019 10:04 )             44.3     06-25    139  |  104  |  18  ----------------------------<  128<H>  4.6   |  26  |  1.17    Ca    9.1      25 Jun 2019 10:04        A/P Ortho Stable s/p elective Right total hip arthroplasty POD#zero, postop h/h , Creat   ck Labs in am  ASA for anticoagulation   Physical Therapy BID: WBAT, TOTAL HIP PRECAUTIONS ANTERIOR  pain control regimen as per Dr. Vega's protocol

## 2019-06-25 NOTE — PHYSICAL THERAPY INITIAL EVALUATION ADULT - CRITERIA FOR SKILLED THERAPEUTIC INTERVENTIONS
predicted duration of therapy intervention/impairments found/therapy frequency/anticipated discharge recommendation/rehab potential

## 2019-06-25 NOTE — CONSULT NOTE ADULT - ASSESSMENT
53 year old man PMH of osteoarthritis s/p elective right hip replacement. Pt is POD#0 and doing well

## 2019-06-25 NOTE — OCCUPATIONAL THERAPY INITIAL EVALUATION ADULT - PLANNED THERAPY INTERVENTIONS, OT EVAL
ADL retraining/balance training/ROM/strengthening/bed mobility training/transfer training/neuromuscular re-education

## 2019-06-25 NOTE — DISCHARGE NOTE PROVIDER - NSDCACTIVITY_GEN_ALL_CORE
Do not drive or operate machinery/Walking - Outdoors allowed/Do not make important decisions/Stairs allowed/Showering allowed/Walking - Indoors allowed/No heavy lifting/straining

## 2019-06-25 NOTE — CONSULT NOTE ADULT - PROBLEM SELECTOR RECOMMENDATION 9
s/p R total hip replacement POD#0  Perioperative abx as per ortho  PT/OT  Pain control- Continue current pain regimen. P  Pt reports he doesn't like opiods.  Bowel regimen  Encourage incentive spirometry  DVT ppx as per ortho s/p R total hip replacement POD#0  Perioperative abx as per ortho  PT/OT  Pain control- Continue current pain regimen.   Pt reports he doesn't like opiods.  Bowel regimen  Encourage incentive spirometry  DVT ppx as per ortho

## 2019-06-25 NOTE — DISCHARGE NOTE PROVIDER - CARE PROVIDER_API CALL
Darian Vega)  Orthopaedic Surgery  [address], [address]  [place] [address]  Phone: [phone]  Fax: [phone]  Follow Up Time: 2 weeks

## 2019-06-25 NOTE — CONSULT NOTE ADULT - ASSESSMENT
Patient is a 54y old  Male who presents with a chief complaint of Right hip replacement (25 Jun 2019 13:21).    S/p Rt THR:  WBAT  Pain control - Adequate.  DVT prophylaxis  BID  Bowel regimen.  Ortho f/up noted.    HLD:  Niaspan    Dw family.

## 2019-06-25 NOTE — DISCHARGE NOTE PROVIDER - HOSPITAL COURSE
54year old male is admitted for elective Right total hip arthroplasty 6/25/2019 without any intraoperative complications.  Patient is doing well and stable for discharge.  Patient is tolerating physical therapy: weight bearing to Right leg, gait training, STRICT ANTERIOR HIP PRECAUTIONS.  Keep dressing on as is until day of office visit.  Staples/sutures if applicable, to be removed in Dr. Vega's office 14 days from date of surgery.  Patient is on Aspirin (MUST TAKE WITH FOOD) for anticoagulation.  Orthopaedic Surgeon Dr. Vega would like patient to call private MD/Internist for appointment postop to maintain continuity of care.  Follow up with Dr. Vega's office in 1-2 weeks.         Istop reviewed Reference #: [id number]

## 2019-06-25 NOTE — PHYSICAL THERAPY INITIAL EVALUATION ADULT - ADDITIONAL COMMENTS
Pt. reports owning DME of rolling walker.     Pt. was left supine in bed post PT Evaluation, NAD, all lines intact, call bell within reach, wife present. RN Leila aware of pt. participation in PT.

## 2019-06-25 NOTE — OCCUPATIONAL THERAPY INITIAL EVALUATION ADULT - LIVES WITH, PROFILE
spouse/Pt lives in a house with steps to manage. Pt has a bathtub with grab bars and a shower chair.

## 2019-06-25 NOTE — OCCUPATIONAL THERAPY INITIAL EVALUATION ADULT - MD ORDER
Occupational Therapy to evaluate and treat. Occupational Therapy to evaluate and treat.  Out of bed to chair.  Ambulate as tolerated.

## 2019-06-25 NOTE — CONSULT NOTE ADULT - SUBJECTIVE AND OBJECTIVE BOX
Patient is a 54y old  Male who presents with a chief complaint of Right hip replacement (25 Jun 2019 13:21)      HPI:  55y/o male scheduled for right total hip replacement direct anterior approach on 6/25/19 .  Pt states he had left hip done last year with good results and has decided to have right hip done at this time- c/o of occasional right hip pain but denies need for support or weakness. (04 Jun 2019 07:08)      PAST MEDICAL & SURGICAL HISTORY:  Hyperlipidemia  Seasonal allergies  History of BPH  Osteoarthritis  S/P hip replacement, left      Review of Systems:   CONSTITUTIONAL: No fever,  or fatigue  RESPIRATORY: No cough, wheezing, chills or hemoptysis; No shortness of breath  CARDIOVASCULAR: No chest pain, palpitations, dizziness, or leg swelling  GASTROINTESTINAL: No abdominal or epigastric pain. No nausea, vomiting, or hematemesis; No diarrhea or constipation.   NEUROLOGICAL: No headaches,           Allergies    No Known Allergies    Intolerances        Social History:     FAMILY HISTORY:  FH: colon cancer: mother      MEDICATIONS  (STANDING):  acetaminophen   Tablet .. 975 milliGRAM(s) Oral every 8 hours  aspirin enteric coated 325 milliGRAM(s) Oral two times a day  dexamethasone  IVPB 10 milliGRAM(s) IV Intermittent once  docusate sodium 100 milliGRAM(s) Oral three times a day  gabapentin 100 milliGRAM(s) Oral every 8 hours  ketorolac   Injectable 15 milliGRAM(s) IV Push every 6 hours  lactated ringers. 1000 milliLiter(s) (150 mL/Hr) IV Continuous <Continuous>  niacin  milliGRAM(s) Oral at bedtime  pantoprazole    Tablet 40 milliGRAM(s) Oral before breakfast  polyethylene glycol 3350 17 Gram(s) Oral daily  senna 2 Tablet(s) Oral at bedtime  sodium chloride 0.9% Bolus 1000 milliLiter(s) IV Bolus once  sodium chloride 0.9% lock flush 3 milliLiter(s) IV Push every 8 hours  traMADol 50 milliGRAM(s) Oral every 8 hours    MEDICATIONS  (PRN):  acetaminophen   Tablet .. 650 milliGRAM(s) Oral every 6 hours PRN Temp greater or equal to 38.5C (101.3F)  aluminum hydroxide/magnesium hydroxide/simethicone Suspension 30 milliLiter(s) Oral four times a day PRN Indigestion  HYDROmorphone  Injectable 0.5 milliGRAM(s) IV Push every 4 hours PRN breakthrough pain  ondansetron Injectable 4 milliGRAM(s) IV Push every 6 hours PRN Nausea and/or Vomiting  oxyCODONE    IR 5 milliGRAM(s) Oral every 4 hours PRN mild and moderate pain  oxyCODONE    IR 10 milliGRAM(s) Oral every 4 hours PRN Severe Pain (7 - 10)      CAPILLARY BLOOD GLUCOSE      POCT Blood Glucose.: 94 mg/dL (25 Jun 2019 06:07)    I&O's Summary    25 Jun 2019 07:01  -  26 Jun 2019 00:38  --------------------------------------------------------  IN: 0 mL / OUT: 400 mL / NET: -400 mL        PHYSICAL EXAM:    GENERAL: NAD  NECK: Supple, No JVD  CHEST/LUNG: Clear to auscultation bilaterally; No wheezing.  HEART: Regular rate and rhythm; No murmurs, rubs, or gallops  ABDOMEN: Soft, Nontender, Nondistended; Bowel sounds present  EXTREMITIES:  2+ Peripheral Pulses, No edema  NEUROLOGY: AAOx 3      LABS:                        14.7   9.56  )-----------( 248      ( 25 Jun 2019 10:04 )             44.3     06-25    139  |  104  |  18  ----------------------------<  128<H>  4.6   |  26  |  1.17    Ca    9.1      25 Jun 2019 10:04              CAPILLARY BLOOD GLUCOSE      POCT Blood Glucose.: 94 mg/dL (25 Jun 2019 06:07)                RADIOLOGY & ADDITIONAL TESTS:    Imaging Personally Reviewed:    Consultant(s) Notes Reviewed:      Care Discussed with Consultants/Other Providers:    Thanks for consult. Will follow.

## 2019-06-25 NOTE — CONSULT NOTE ADULT - SUBJECTIVE AND OBJECTIVE BOX
HPI:  55y/o male PMH of OA of hips s/p left hip replacement last year and now presents for right hip replacement. Seen and evaluated post op. Reports pain is well controlled. Denies any post-op issues. No chest pain, SOB, fevers or chills. Reports he had good outcome last year with left hip and is aware of what to expect post-operatively. Worked with PT already and did well. Had BM this morning. Patient takes many vitamins and baby asa otherwise does not take any other medications. Has h/o white coat HTN but does not require any medication. Overall healthy and has good exercise tolerance. Patient is hopeful to be discharged tomorrow.       PAST MEDICAL & SURGICAL HISTORY:  Hyperlipidemia  Seasonal allergies  History of BPH  Osteoarthritis  S/P hip replacement, left      Review of Systems:   CONSTITUTIONAL: No fever  EYES: No vision changes  ENMT: ; No sinus or throat pain  NECK: No pain or stiffness  RESPIRATORY: No shortness of breath  CARDIOVASCULAR: No chest pain, palpitations, dizziness, or leg swelling  GASTROINTESTINAL: No abdominal or epigastric pain.  GENITOURINARY: No dysuria, frequency  NEUROLOGICAL: No headaches, memory loss, loss of strength, numbness, or tremors  SKIN: No itching, burning, rashes, or lesions   LYMPH NODES: No enlarged glands  MUSCULOSKELETAL: right hip dressing intact   PSYCHIATRIC: No depression, no anxiety   HEME/LYMPH: No easy bruising, or bleeding gums  ALLERY AND IMMUNOLOGIC: No hives or eczema    Allergies    No Known Allergies    Intolerances    Social History: , , quit smoking few months ago, no alcohol use for 20+ years     FAMILY HISTORY:  FH: colon cancer: mother      MEDICATIONS  (STANDING):  acetaminophen   Tablet .. 975 milliGRAM(s) Oral every 8 hours  aspirin enteric coated 325 milliGRAM(s) Oral two times a day  ceFAZolin   IVPB 2000 milliGRAM(s) IV Intermittent every 8 hours  dexamethasone  IVPB 10 milliGRAM(s) IV Intermittent once  docusate sodium 100 milliGRAM(s) Oral three times a day  gabapentin 100 milliGRAM(s) Oral every 8 hours  ketorolac   Injectable 15 milliGRAM(s) IV Push every 6 hours  lactated ringers. 1000 milliLiter(s) (150 mL/Hr) IV Continuous <Continuous>  niacin  milliGRAM(s) Oral at bedtime  pantoprazole    Tablet 40 milliGRAM(s) Oral before breakfast  polyethylene glycol 3350 17 Gram(s) Oral daily  senna 2 Tablet(s) Oral at bedtime  sodium chloride 0.9% lock flush 3 milliLiter(s) IV Push every 8 hours  traMADol 50 milliGRAM(s) Oral every 8 hours    MEDICATIONS  (PRN):  acetaminophen   Tablet .. 650 milliGRAM(s) Oral every 6 hours PRN Temp greater or equal to 38.5C (101.3F)  aluminum hydroxide/magnesium hydroxide/simethicone Suspension 30 milliLiter(s) Oral four times a day PRN Indigestion  HYDROmorphone  Injectable 0.5 milliGRAM(s) IV Push every 4 hours PRN breakthrough pain  ondansetron Injectable 4 milliGRAM(s) IV Push every 6 hours PRN Nausea and/or Vomiting  oxyCODONE    IR 5 milliGRAM(s) Oral every 4 hours PRN mild and moderate pain  oxyCODONE    IR 10 milliGRAM(s) Oral every 4 hours PRN Severe Pain (7 - 10)    Vital Signs Last 24 Hrs  T(C): 36.8 (25 Jun 2019 11:46), Max: 37.2 (25 Jun 2019 05:45)  T(F): 98.3 (25 Jun 2019 11:46), Max: 98.9 (25 Jun 2019 05:45)  HR: 96 (25 Jun 2019 11:46) (73 - 97)  BP: 154/76 (25 Jun 2019 11:46) (115/78 - 156/90)  BP(mean): 87 (25 Jun 2019 11:15) (84 - 91)  RR: 18 (25 Jun 2019 11:46) (10 - 18)  SpO2: 97% (25 Jun 2019 11:46) (93% - 98%)    POCT Blood Glucose.: 94 mg/dL (25 Jun 2019 06:07)    I&O's Summary      PHYSICAL EXAM:  GENERAL: NAD, well-developed  HEAD:  Atraumatic, Normocephalic  EYES: EOMI, PERRLA, conjunctiva and sclera clear  NECK: Supple, No JVD  CHEST/LUNG: Clear to auscultation bilaterally; No wheeze  HEART: Regular rate and rhythm; No murmurs, rubs, or gallops  ABDOMEN: Soft, Nontender, Nondistended; Bowel sounds present  EXTREMITIES:  Right hip dressing intact  PSYCH: AAOx3  NEUROLOGY: non-focal  SKIN: No rashes or lesions    LABS:                        14.7   9.56  )-----------( 248      ( 25 Jun 2019 10:04 )             44.3     06-25    139  |  104  |  18  ----------------------------<  128<H>  4.6   |  26  |  1.17    Ca    9.1      25 Jun 2019 10:04    RADIOLOGY & ADDITIONAL TESTS: < from: Xray Pelvis AP only (06.25.19 @ 09:25) >  Cementless right total hip prosthesis with screw fixated acetabular cup   currently implanted.    Intact and aligned hardware and no periprosthetic fractures.    Postoperative soft tissue changes.    Correlate with intraoperative findings.    Stable intact aligned and uncomplicated previously seen cementless left   total hip prosthesis with screw fixated acetabular cup.    < end of copied text >      Imaging Personally Reviewed:    Consultant(s) Notes Reviewed:      Care Discussed with Consultants/Other Providers:    Assessment and Plan:

## 2019-06-25 NOTE — DISCHARGE NOTE PROVIDER - NSDCCPTREATMENT_GEN_ALL_CORE_FT
PRINCIPAL PROCEDURE  Procedure: Right total hip arthroplasty  Findings and Treatment: dictated operative note  pain control, pain med may cause drowsiness, refrain from activities require decision making, physical therapy to help resume activities of daily living  weight bearing as tolerated to Right leg  TOTAL HIP PRECAUTIONS ANTERIOR  Office appointment is already made (see card attached to discharge folder) so if you need to reschedule please call Dr. Vega's office [phone]

## 2019-06-25 NOTE — PHYSICAL THERAPY INITIAL EVALUATION ADULT - DISCHARGE DISPOSITION, PT EVAL
Home with Home Physical Therapy to address current impairments and restore previous level of function.

## 2019-06-25 NOTE — DISCHARGE NOTE PROVIDER - NSDCCPCAREPLAN_GEN_ALL_CORE_FT
PRINCIPAL DISCHARGE DIAGNOSIS  Diagnosis: Primary osteoarthritis of right hip  Assessment and Plan of Treatment: 54year old male is admitted for elective Right total hip arthroplasty 6/25/2019 without any intraoperative complications.  Patient is doing well and stable for discharge.  Patient is tolerating physical therapy: weight bearing to Right leg, gait training, STRICT ANTERIOR HIP PRECAUTIONS.  Keep dressing on as is until day of office visit.  Staples/sutures if applicable, to be removed in Dr. Vega's office 14 days from date of surgery.  Patient is on Aspirin (MUST TAKE WITH FOOD) for anticoagulation.  Orthopaedic Surgeon Dr. Vega would like patient to call private MD/Internist for appointment postop to maintain continuity of care.  Follow up with Dr. Vega's office in 1-2 weeks.   Istop reviewed Reference #: [id number]

## 2019-06-25 NOTE — CONSULT NOTE ADULT - PROBLEM SELECTOR RECOMMENDATION 3
-Patient takes multiple vitamins at home including Vit C, Vit E, Vit B, Vit D, probiotics, biotin, niacin  -Offered medications inpt however patient states that he will take them at home tomorrow.

## 2019-06-25 NOTE — DISCHARGE NOTE PROVIDER - NSDCFUADDINST_GEN_ALL_CORE_FT
dressing is water resistant but still keep direct stream of water away from the dressing  no swimming or bathing where wound is exposed to water for long periods of time

## 2019-06-25 NOTE — PHYSICAL THERAPY INITIAL EVALUATION ADULT - RANGE OF MOTION EXAMINATION, REHAB EVAL
bilateral upper extremity ROM was WFL (within functional limits)/bilateral lower extremity ROM was WFL (within functional limits)/except right anterior hip precautions maintained.

## 2019-06-26 ENCOUNTER — TRANSCRIPTION ENCOUNTER (OUTPATIENT)
Age: 54
End: 2019-06-26

## 2019-06-26 ENCOUNTER — RESULT REVIEW (OUTPATIENT)
Age: 54
End: 2019-06-26

## 2019-06-26 VITALS
DIASTOLIC BLOOD PRESSURE: 74 MMHG | RESPIRATION RATE: 16 BRPM | TEMPERATURE: 98 F | OXYGEN SATURATION: 97 % | HEART RATE: 81 BPM | SYSTOLIC BLOOD PRESSURE: 134 MMHG

## 2019-06-26 LAB
ANION GAP SERPL CALC-SCNC: 12 MMO/L — SIGNIFICANT CHANGE UP (ref 7–14)
BUN SERPL-MCNC: 18 MG/DL — SIGNIFICANT CHANGE UP (ref 7–23)
CALCIUM SERPL-MCNC: 9.1 MG/DL — SIGNIFICANT CHANGE UP (ref 8.4–10.5)
CHLORIDE SERPL-SCNC: 105 MMOL/L — SIGNIFICANT CHANGE UP (ref 98–107)
CO2 SERPL-SCNC: 21 MMOL/L — LOW (ref 22–31)
CREAT SERPL-MCNC: 0.9 MG/DL — SIGNIFICANT CHANGE UP (ref 0.5–1.3)
GLUCOSE SERPL-MCNC: 189 MG/DL — HIGH (ref 70–99)
HCT VFR BLD CALC: 37.4 % — LOW (ref 39–50)
HGB BLD-MCNC: 12.6 G/DL — LOW (ref 13–17)
MCHC RBC-ENTMCNC: 29.8 PG — SIGNIFICANT CHANGE UP (ref 27–34)
MCHC RBC-ENTMCNC: 33.7 % — SIGNIFICANT CHANGE UP (ref 32–36)
MCV RBC AUTO: 88.4 FL — SIGNIFICANT CHANGE UP (ref 80–100)
NRBC # FLD: 0 K/UL — SIGNIFICANT CHANGE UP (ref 0–0)
PLATELET # BLD AUTO: 246 K/UL — SIGNIFICANT CHANGE UP (ref 150–400)
PMV BLD: 10.1 FL — SIGNIFICANT CHANGE UP (ref 7–13)
POTASSIUM SERPL-MCNC: 4.5 MMOL/L — SIGNIFICANT CHANGE UP (ref 3.5–5.3)
POTASSIUM SERPL-SCNC: 4.5 MMOL/L — SIGNIFICANT CHANGE UP (ref 3.5–5.3)
RBC # BLD: 4.23 M/UL — SIGNIFICANT CHANGE UP (ref 4.2–5.8)
RBC # FLD: 13.4 % — SIGNIFICANT CHANGE UP (ref 10.3–14.5)
SODIUM SERPL-SCNC: 138 MMOL/L — SIGNIFICANT CHANGE UP (ref 135–145)
WBC # BLD: 15.14 K/UL — HIGH (ref 3.8–10.5)
WBC # FLD AUTO: 15.14 K/UL — HIGH (ref 3.8–10.5)

## 2019-06-26 PROCEDURE — 88305 TISSUE EXAM BY PATHOLOGIST: CPT | Mod: 26

## 2019-06-26 PROCEDURE — 99232 SBSQ HOSP IP/OBS MODERATE 35: CPT

## 2019-06-26 PROCEDURE — 88311 DECALCIFY TISSUE: CPT | Mod: 26

## 2019-06-26 PROCEDURE — 99238 HOSP IP/OBS DSCHRG MGMT 30/<: CPT

## 2019-06-26 RX ADMIN — POLYETHYLENE GLYCOL 3350 17 GRAM(S): 17 POWDER, FOR SOLUTION ORAL at 11:05

## 2019-06-26 RX ADMIN — Medication 325 MILLIGRAM(S): at 06:10

## 2019-06-26 RX ADMIN — Medication 975 MILLIGRAM(S): at 12:57

## 2019-06-26 RX ADMIN — GABAPENTIN 100 MILLIGRAM(S): 400 CAPSULE ORAL at 12:57

## 2019-06-26 RX ADMIN — Medication 102 MILLIGRAM(S): at 06:11

## 2019-06-26 RX ADMIN — Medication 100 MILLIGRAM(S): at 00:08

## 2019-06-26 RX ADMIN — TRAMADOL HYDROCHLORIDE 50 MILLIGRAM(S): 50 TABLET ORAL at 06:09

## 2019-06-26 RX ADMIN — Medication 15 MILLIGRAM(S): at 00:08

## 2019-06-26 RX ADMIN — PANTOPRAZOLE SODIUM 40 MILLIGRAM(S): 20 TABLET, DELAYED RELEASE ORAL at 06:10

## 2019-06-26 RX ADMIN — Medication 100 MILLIGRAM(S): at 06:10

## 2019-06-26 RX ADMIN — GABAPENTIN 100 MILLIGRAM(S): 400 CAPSULE ORAL at 06:09

## 2019-06-26 RX ADMIN — Medication 100 MILLIGRAM(S): at 12:57

## 2019-06-26 RX ADMIN — SODIUM CHLORIDE 1000 MILLILITER(S): 9 INJECTION INTRAMUSCULAR; INTRAVENOUS; SUBCUTANEOUS at 06:11

## 2019-06-26 RX ADMIN — Medication 975 MILLIGRAM(S): at 06:10

## 2019-06-26 RX ADMIN — SODIUM CHLORIDE 150 MILLILITER(S): 9 INJECTION, SOLUTION INTRAVENOUS at 06:11

## 2019-06-26 RX ADMIN — SODIUM CHLORIDE 3 MILLILITER(S): 9 INJECTION INTRAMUSCULAR; INTRAVENOUS; SUBCUTANEOUS at 06:16

## 2019-06-26 RX ADMIN — Medication 15 MILLIGRAM(S): at 06:09

## 2019-06-26 RX ADMIN — Medication 15 MILLIGRAM(S): at 11:04

## 2019-06-26 NOTE — DISCHARGE NOTE NURSING/CASE MANAGEMENT/SOCIAL WORK - NSDCPNINST_GEN_ALL_CORE
Call MD for any complain of swelling, numbness, tingling to leg, or if pain not relieved after taking pain medications and fever.  You have a post op appointment with Dr. Freeman on July 11, 2019 @8:45 AM @[facility], if unable to attend, please call office to reschedule.  Take over the counter stool softener to prevent constipation that can be caused by taking pain medication.  Leave dressing on till you see doctor at office.

## 2019-06-26 NOTE — PROGRESS NOTE ADULT - ASSESSMENT
54M sp R MARY    ·	Neuro: Pain control  ·	Resp: IS  ·	GI: Regular diet, bowel reg  ·	MSK: WBAT, PT/OT  ·	Heme: DVT PPX  ·	Dispo: home today    Ortho [id number]

## 2019-06-26 NOTE — DISCHARGE NOTE NURSING/CASE MANAGEMENT/SOCIAL WORK - NSDCPECAREGIVERED_GEN_ALL_CORE
know your medication side effects, managing pain at home, exercise worksheet, sex positions after joint replacement, force patient guide, anterior hip precautions, total hip replacement discharge instruction sheet,  total hip replacement

## 2019-06-26 NOTE — DISCHARGE NOTE NURSING/CASE MANAGEMENT/SOCIAL WORK - NSDCPEWEB_GEN_ALL_CORE
[facility] for Tobacco Control website --- [email or web address] website --- [email or web address]

## 2019-06-26 NOTE — DISCHARGE NOTE NURSING/CASE MANAGEMENT/SOCIAL WORK - NSDPDISTO_GEN_ALL_CORE
Home with home care/Right hip dressing c/d/i  Toes warm and mobile  +NVS  +cap refill. +void  tolerated po and fluids

## 2019-06-26 NOTE — PROGRESS NOTE ADULT - PROBLEM SELECTOR PLAN 1
s/p R total hip replacement POD#1  Perioperative abx as per ortho  PT/OT  Pain control- Continue current pain regimen.   Pt reports he doesn't like opiods.  Bowel regimen  Encourage incentive spirometry  DVT ppx as per ortho.

## 2019-06-26 NOTE — DISCHARGE NOTE NURSING/CASE MANAGEMENT/SOCIAL WORK - NSDCPEEMAIL_GEN_ALL_CORE
[facility] for Tobacco Control email [email or web address]

## 2019-06-26 NOTE — PROGRESS NOTE ADULT - ASSESSMENT
53 year old man PMH of osteoarthritis s/p elective right hip replacement. Pt is POD#1 and doing well

## 2019-06-26 NOTE — PROGRESS NOTE ADULT - SUBJECTIVE AND OBJECTIVE BOX
Both strengths last refilled on 1/6/17 for # 30 with 0 rf. Last seen on 4/4/16. Future Appointments  Date Time Provider Tobin Friend   3/8/2017 1:15 PM Jojo Soares MD [facility]        Thank you. Ortho Progress Note    S: Patient seen and examined. No acute events overnight. Pain well controlled with current regimen. Denies lightheadedness/dizziness, CP/SOB. Tolerating diet.       O:  Physical Exam:  Gen: Laying in bed, NAD, alert and oriented.   Resp: Unlabored breathing  Ext: EHL/FHL/TA/Sol intact          + SILT DP/SP/VANEGAS/Sa/Tib          +DP, extremity WWP  dressing was saturated, mepelex changed.     Vital Signs Last 24 Hrs  T(C): 36.7 (26 Jun 2019 06:00), Max: 36.8 (25 Jun 2019 11:46)  T(F): 98.1 (26 Jun 2019 06:00), Max: 98.3 (25 Jun 2019 11:46)  HR: 82 (26 Jun 2019 06:00) (73 - 98)  BP: 134/84 (26 Jun 2019 06:00) (115/78 - 154/76)  BP(mean): 87 (25 Jun 2019 11:15) (84 - 91)  RR: 16 (26 Jun 2019 06:00) (10 - 18)  SpO2: 95% (26 Jun 2019 06:00) (93% - 98%)                          14.7   9.56  )-----------( 248      ( 25 Jun 2019 10:04 )             44.3       06-25    139  |  104  |  18  ----------------------------<  128<H>  4.6   |  26  |  1.17

## 2019-06-26 NOTE — PROGRESS NOTE ADULT - PROBLEM SELECTOR PLAN 3
Patient takes multiple vitamins at home including Vit C, Vit E, Vit B, Vit D, probiotics, biotin, niacin  -Offered medications inpt however patient states that he will take them at home today

## 2019-06-26 NOTE — DISCHARGE NOTE NURSING/CASE MANAGEMENT/SOCIAL WORK - NSDCDPATPORTLINK_GEN_ALL_CORE
You can access the [facility] Patient Portal, offered by [facility], by registering with the following website: [email or web address]

## 2019-06-26 NOTE — PROGRESS NOTE ADULT - SUBJECTIVE AND OBJECTIVE BOX
Patient is a 54y old  Male who presents with a chief complaint of Right hip replacement (25 Jun 2019 13:21)    SUBJECTIVE / OVERNIGHT EVENTS: Doing well. Denies any concerns this morning. Pain well controlled. Awaiting discharge     MEDICATIONS  (STANDING):  acetaminophen   Tablet .. 975 milliGRAM(s) Oral every 8 hours  aspirin enteric coated 325 milliGRAM(s) Oral two times a day  docusate sodium 100 milliGRAM(s) Oral three times a day  gabapentin 100 milliGRAM(s) Oral every 8 hours  ketorolac   Injectable 15 milliGRAM(s) IV Push every 6 hours  niacin  milliGRAM(s) Oral at bedtime  pantoprazole    Tablet 40 milliGRAM(s) Oral before breakfast  polyethylene glycol 3350 17 Gram(s) Oral daily  senna 2 Tablet(s) Oral at bedtime  sodium chloride 0.9% lock flush 3 milliLiter(s) IV Push every 8 hours  traMADol 50 milliGRAM(s) Oral every 8 hours    MEDICATIONS  (PRN):  acetaminophen   Tablet .. 650 milliGRAM(s) Oral every 6 hours PRN Temp greater or equal to 38.5C (101.3F)  aluminum hydroxide/magnesium hydroxide/simethicone Suspension 30 milliLiter(s) Oral four times a day PRN Indigestion  HYDROmorphone  Injectable 0.5 milliGRAM(s) IV Push every 4 hours PRN breakthrough pain  ondansetron Injectable 4 milliGRAM(s) IV Push every 6 hours PRN Nausea and/or Vomiting  oxyCODONE    IR 5 milliGRAM(s) Oral every 4 hours PRN mild and moderate pain  oxyCODONE    IR 10 milliGRAM(s) Oral every 4 hours PRN Severe Pain (7 - 10)      Vital Signs Last 24 Hrs  T(C): 36.7 (26 Jun 2019 09:25), Max: 36.8 (25 Jun 2019 16:51)  T(F): 98 (26 Jun 2019 09:25), Max: 98.3 (26 Jun 2019 02:03)  HR: 81 (26 Jun 2019 09:25) (81 - 98)  BP: 134/74 (26 Jun 2019 09:25) (116/72 - 134/84)  BP(mean): --  RR: 16 (26 Jun 2019 09:25) (16 - 18)  SpO2: 97% (26 Jun 2019 09:25) (95% - 97%)    PHYSICAL EXAM:  GENERAL: NAD, well-developed  HEAD:  Atraumatic, Normocephalic  EYES: EOMI, PERRLA, conjunctiva and sclera clear  NECK: Supple, No JVD  CHEST/LUNG: Clear to auscultation bilaterally; No wheeze  HEART: Regular rate and rhythm; No murmurs, rubs, or gallops  ABDOMEN: Soft, Nontender, Nondistended; Bowel sounds present  EXTREMITIES:  2+ Peripheral Pulses, No clubbing, cyanosis, or edema  PSYCH: AAOx3  NEUROLOGY: non-focal  SKIN: No rashes or lesions    LABS:                        12.6   15.14 )-----------( 246      ( 26 Jun 2019 06:45 )             37.4     06-26    138  |  105  |  18  ----------------------------<  189<H>  4.5   |  21<L>  |  0.90    Ca    9.1      26 Jun 2019 06:45                RADIOLOGY & ADDITIONAL TESTS:    Imaging Personally Reviewed:    Consultant(s) Notes Reviewed:      Care Discussed with Consultants/Other Providers:    Assessment and Plan:

## 2019-07-09 LAB — SURGICAL PATHOLOGY STUDY: SIGNIFICANT CHANGE UP

## 2019-07-11 ENCOUNTER — APPOINTMENT (OUTPATIENT)
Dept: ORTHOPEDIC SURGERY | Facility: CLINIC | Age: 54
End: 2019-07-11
Payer: COMMERCIAL

## 2019-07-11 VITALS
HEIGHT: 68 IN | HEART RATE: 75 BPM | SYSTOLIC BLOOD PRESSURE: 154 MMHG | BODY MASS INDEX: 30.31 KG/M2 | DIASTOLIC BLOOD PRESSURE: 84 MMHG | WEIGHT: 200 LBS

## 2019-07-11 PROCEDURE — 99024 POSTOP FOLLOW-UP VISIT: CPT

## 2019-07-11 PROCEDURE — 73502 X-RAY EXAM HIP UNI 2-3 VIEWS: CPT | Mod: RT

## 2019-07-12 NOTE — HISTORY OF PRESENT ILLNESS
[Neuro Intact] : an unremarkable neurological exam [Vascular Intact] : ~T peripheral vascular exam normal [Negative Awais's] : maneuvers demonstrated a negative Awais's sign [Doing Well] : is doing well [No Sign of Infection] : is showing no signs of infection [Adequate Pain Control] : has adequate pain control [de-identified] : s/p right total hip replacement 6/25/2019 [de-identified] : Mr. LISA COPE  presents s/p right total hip replacement.  he  is participating in home physical therapy as well as a home exercise program daily. he  is taking tylenol  for pain and pain level is controlled. he  is taking ecotrin for DVT ppx.\par He notes his bandage was removed prior to discharge from the hospital, and that he has skin tears.  He notes they have healed. He also complains of a rash that has developed over the last couple days, along his anterior LE RLE. \par He notes it has been itchy, and he has been scratching it until last night. \par He took a dose of Benadryl last night, with improvement  [de-identified] : right  hip: Walks with right stiff hip gait. The dressing was removed, incision was cleaned with alcohol, and steri strips applied. medial to the proximal incision, there is a skin tear. Xeroform was applied to the skin tear, and a dry dressing applied. There is a well healed scar of surgery with no significant swelling, redness, or tenderness. Range of motion of the hip: active SLR of 30 degrees and hip flexion to 60 degrees Abduction 30 degrees adduction 15 degrees external rotation 30 degrees internal rotation 15 degrees with hip abductor extensor power grade +4. \par Along the anterior shin RLE, there is an active erythematous dermatitis.  [de-identified] : AP, and false profile views of the right hip and AP view of the pelvis taken today reveal a well fixed and aligned right total hip replacement with no signs of mechanical failure or periprosthetic fracture.\par  [de-identified] : s/p right total hip replacement, with pruritic dermatitis along the right LE, likely secondary to medication use.  [de-identified] : The patient was informed of the findings and recommended conservative management in the form of a home exercise program, activity modifications, stationary bicycling, and ambulation with a cane.  A prescription for a course of physical therapy was provided.  At this time he has been recommended to stop taking tramadol and gabapentin. He will take tylenol only for pain.  He will take aspirin for DVT prophylaxis for another four weeks.  Concerning the dermatitis/allergic rection, he has been advised to continue with benadryl.  He has been recommended and prescribed hydrocortisone cream for the skin.  Concerning the skin tear along the proximal incision, he has been recommended on daily dressing changes.  HE has been recommended to follow up next week for skin check.  He has been prescribed Keflex for infection ppx. The risks, benefits, and side effects were discussed.  Follow up appointment was recommended in one week for skin check. He has been advised to call or message us if the symptoms of either the skin tear or the allergic reaction worsen. \par

## 2019-07-12 NOTE — END OF VISIT
[FreeTextEntry3] : I have read the above documentation and agree with the treatment and plan. \par Darian Vega MD\par \par ______________________________________________\par [facility]: Hip/Knee Arthroplasty\par [address], [address], [place] [address]\par (t) [phone]\par (f) [phone]\par

## 2019-07-19 ENCOUNTER — APPOINTMENT (OUTPATIENT)
Dept: ORTHOPEDIC SURGERY | Facility: CLINIC | Age: 54
End: 2019-07-19
Payer: COMMERCIAL

## 2019-07-19 PROCEDURE — 99024 POSTOP FOLLOW-UP VISIT: CPT

## 2019-07-19 NOTE — HISTORY OF PRESENT ILLNESS
[Clean/Dry/Intact] : clean, dry and intact [Healed] : healed [Neuro Intact] : an unremarkable neurological exam [Vascular Intact] : ~T peripheral vascular exam normal [Negative Awais's] : maneuvers demonstrated a negative Awais's sign [Doing Well] : is doing well [No Sign of Infection] : is showing no signs of infection [Adequate Pain Control] : has adequate pain control [de-identified] : s/p right total hip replacement 6/25/2019 [de-identified] : Mr. LISA COPE  presents s/p right total hip replacement.  he  is participating in home physical therapy as well as a home exercise program daily. he is taking tylenol  for pain and pain level is controlled. he  is taking ecotrin for DVT ppx.\par He has relief and improvement of his rash and his incision is now healed.  [de-identified] : right  hip: Walks with right stiff hip gait.  medial to the proximal incision, there is a skin tear, healed. There is a well healed scar of surgery with no significant swelling, redness, or tenderness. Range of motion of the hip: active SLR of 30 degrees and hip flexion to 60 degrees Abduction 30 degrees adduction 15 degrees external rotation 30 degrees internal rotation 15 degrees with hip abductor extensor power grade +4. \par  [de-identified] : AP, and false profile views of the right hip and AP view of the pelvis taken last visit reveal a well fixed and aligned right total hip replacement with no signs of mechanical failure or periprosthetic fracture.\par  [de-identified] : s/p right total hip replacement doing well.  [de-identified] : The patient was informed of the findings and recommended conservative management in the form of a home exercise program, activity modifications, stationary bicycling, and ambulation with a cane.  A prescription for a course of physical therapy was provided.  He will continue the cane for another 4 weeks. He will follow up in four to six weeks.

## 2019-07-30 ENCOUNTER — INBOUND DOCUMENT (OUTPATIENT)
Age: 54
End: 2019-07-30

## 2019-08-23 ENCOUNTER — APPOINTMENT (OUTPATIENT)
Dept: ORTHOPEDIC SURGERY | Facility: CLINIC | Age: 54
End: 2019-08-23
Payer: COMMERCIAL

## 2019-08-23 VITALS
WEIGHT: 228 LBS | DIASTOLIC BLOOD PRESSURE: 91 MMHG | SYSTOLIC BLOOD PRESSURE: 144 MMHG | HEIGHT: 68 IN | BODY MASS INDEX: 34.56 KG/M2 | HEART RATE: 62 BPM

## 2019-08-23 DIAGNOSIS — M54.5 LOW BACK PAIN: ICD-10-CM

## 2019-08-23 DIAGNOSIS — Z96.641 PRESENCE OF RIGHT ARTIFICIAL HIP JOINT: ICD-10-CM

## 2019-08-23 PROCEDURE — 99024 POSTOP FOLLOW-UP VISIT: CPT

## 2019-08-23 NOTE — HISTORY OF PRESENT ILLNESS
[Clean/Dry/Intact] : clean, dry and intact [Neuro Intact] : an unremarkable neurological exam [Healed] : healed [Vascular Intact] : ~T peripheral vascular exam normal [Negative Awais's] : maneuvers demonstrated a negative Awais's sign [Doing Well] : is doing well [No Sign of Infection] : is showing no signs of infection [Excellent Pain Control] : has excellent pain control [Chills] : no chills [Fever] : no fever [Erythema] : not erythematous [Discharge] : absent of discharge [Swelling] : not swollen [Dehiscence] : not dehisced [de-identified] : s/p right total hip replacement 6/25/2019 [de-identified] : Mr. LISA COPE  presents s/p right total hip replacement. He is participating in home physical therapy as well as a home exercise program daily. He is taking Tylenol occasionally for pain and pain level is controlled. He is completed taking aspirin 325 BID for DVT ppx and is now back to his ASA 81 as per PCP.   [de-identified] : right  hip: Walks with right stiff hip gait. Skin tear is healed, with no erythema, swelling, or tenderness. There is a well healed scar of surgery with no significant swelling, redness, or tenderness. Range of motion of the hip: active SLR of 60 degrees and hip flexion to 90 degrees Abduction 35 degrees adduction 15 degrees external rotation 30 degrees internal rotation 15 degrees with hip abductor extensor power grade +4. \par  [de-identified] : AP, and false profile views of the right hip and AP view of the pelvis taken last visit reveal a well fixed and aligned right total hip replacement with no signs of mechanical failure or periprosthetic fracture.\par  [de-identified] : s/p right total hip replacement doing well.  [de-identified] : The patient was informed of the findings and recommended conservative management in the form of a home exercise program, activity modifications, stationary bicycling, and ambulation with a cane.  A prescription for a course of physical therapy was provided. Patient di not need medication refills at this time. He will follow up annually.

## 2019-08-23 NOTE — CONSULT LETTER
[I had the pleasure of evaluating your patient, [unfilled].] : I had the pleasure of evaluating your patient, [unfilled]. [Dear  ___] : Dear  [unfilled], [Please see my note below.] : Please see my note below. [Sincerely,] : Sincerely, [FreeTextEntry2] : CONNER NAGY  [FreeTextEntry3] : Darian Vega MD\par \par ________________________________________________\par [facility] : Hip/Knee Arthroplasty\par [address], [place] [address]\par (T) [phone]\par (F) [phone]

## 2019-09-23 ENCOUNTER — FORM ENCOUNTER (OUTPATIENT)
Age: 54
End: 2019-09-23

## 2019-11-27 ENCOUNTER — RX RENEWAL (OUTPATIENT)
Age: 54
End: 2019-11-27

## 2019-12-09 NOTE — ASU PATIENT PROFILE, ADULT - HEALTHCARE INFORMATION NEEDED, PROFILE
Call placed to patient. No answer. Voice message left with instructions listed below. Will try back  
Call placed to patient. Patient note that she's not urinating very often. State that she drink 8 glasses of water every other day and that she's on lasix. Patient v\u to repeat labs in one week. Order sent  
Issue:  Cr 1, above baseline.  Sodium level 125    Plan:  Call placed to  Meliza Krishnamurthy to discuss labs. Left VM, asking or call back.    LPN task:  Please call Meliza Krishnamurthy again tomorrow.  Any recent illness/dehydration?  Any changes/new medications?    Would not recommend any increase in hydration with low sodium, repeat BMP next week. Please send lab order.   Please also check urine protein, we do have recent urine protein test.   
Pt is returning phone call, she has an appt at 1:45pm so anytime after that would work for her. Please connect with her via her cell  
The pt is returning the call- please call the pt before 1p or after 3:30p today or tomorrow 12/5/19 afternoon  
Unsure why patient is calling. RNCC asked for call back with further questions.   
none

## 2020-01-13 NOTE — PATIENT PROFILE ADULT - LIVING ENVIRONMENT
· BL Cr 1-1 5  · Today 3 99 with BUN 47  · Suspect pre-renal 2/2 GI losses + IV contrast use on 1/7 + took 2 exedrins last night for pain  · R/o ATN - f/u repeat BMps, check PVR  · Evaluated by nephrology already, placed on isolyte @ 100/hr  · BMP in am no

## 2020-07-24 NOTE — PATIENT PROFILE ADULT - NSASFALLATTEMPTOOB_GEN_A_NUR
Spoke to patient who states that he was able to schedule with urologist in [place]. Closing encounter. Order removed.    no

## 2021-05-28 NOTE — CONSULT NOTE ADULT - CONSULT REQUESTED DATE/TIME
05/28/21 
5:32 PM 
 
I received a call from Katiemiguelina Peña that hospice team has spoken to patient's sister, Marielena Piper, and that they are having a family meeting felicia to discuss possibly going to hospice house tomorrow. I called patient's other sister, Ryan Soni, who is an LPN and answered all questions about patient's current medical conditions and prognosis. Karen and Ryan Soni are planning on meeting with their mother tonight to discuss hospice. I told them I would be in the hospital all day tomorrow and would happy to meet with them in person, she will let nursing know to page our team if they come visit.   
 
Promise Martinez, DO 
 
 25-Jun-2019 20:35

## 2021-11-10 NOTE — H&P PST ADULT - CIGARS, DURATION OF USE (MO/YR), PROFILE
Patient called me this morning to inform me that [address] will not be coming to his home to change is dressing today. Told patient that I was aware of nurse would not be able to change his dressing daily and it would really help out if he could get a family member to change dressing in between scheduled nurse visits. Patient told me that he was going to ask his father if he would change dressing. I talked to the [address] (Iain) yesterday, she told me that the next visit to patient house will be Friday.       Told MD that [address] nurse will not be able to change patient dressing daily x 5 days but she will make house visits twice a week ( total of 5 days) quit 3 months ago

## 2021-11-23 NOTE — PROGRESS NOTE ADULT - PROVIDER SPECIALTY LIST ADULT
Hospitalist Abd pain and hematuria tonight.  No fever or chills, no testicular pain, no back pain.  No past medical history.

## 2022-05-30 ENCOUNTER — NON-APPOINTMENT (OUTPATIENT)
Age: 57
End: 2022-05-30

## 2022-05-31 ENCOUNTER — NON-APPOINTMENT (OUTPATIENT)
Age: 57
End: 2022-05-31

## 2022-05-31 ENCOUNTER — APPOINTMENT (OUTPATIENT)
Dept: UROLOGY | Facility: CLINIC | Age: 57
End: 2022-05-31
Payer: COMMERCIAL

## 2022-05-31 VITALS
TEMPERATURE: 97.6 F | SYSTOLIC BLOOD PRESSURE: 149 MMHG | HEIGHT: 67 IN | HEART RATE: 86 BPM | RESPIRATION RATE: 17 BRPM | DIASTOLIC BLOOD PRESSURE: 91 MMHG | WEIGHT: 217 LBS | BODY MASS INDEX: 34.06 KG/M2

## 2022-05-31 DIAGNOSIS — N40.0 BENIGN PROSTATIC HYPERPLASIA WITHOUT LOWER URINARY TRACT SYMPMS: ICD-10-CM

## 2022-05-31 PROCEDURE — 99203 OFFICE O/P NEW LOW 30 MIN: CPT

## 2022-05-31 NOTE — ASSESSMENT
[FreeTextEntry1] : 57 year old with LUTS and family history of prostate cancer.\par \par - PSA, after abstain from ejaculation 48 hours\par - discussed saw palmetto, flomax and finasteride, patient would like to hold off on medication at this time, but may consider saw palmetto, will call if would like to start\par - discussed behavioral intervention for nocturia - decrease caffeine and stop fluids 3-4 hours prior to bedtime\par - recommend testing for KERRY\par - fu one year if PSA wnl, MRI if elevated\par \par    I spent 45 -minutes time today on all issues related to this encounter on today date of service including non face to face time.\par

## 2022-05-31 NOTE — PHYSICAL EXAM
[General Appearance - Well Developed] : well developed [General Appearance - Well Nourished] : well nourished [Normal Appearance] : normal appearance [Well Groomed] : well groomed [General Appearance - In No Acute Distress] : no acute distress [Edema] : no peripheral edema [] : no respiratory distress [Respiration, Rhythm And Depth] : normal respiratory rhythm and effort [Exaggerated Use Of Accessory Muscles For Inspiration] : no accessory muscle use [Abdomen Soft] : soft [Abdomen Tenderness] : non-tender [Costovertebral Angle Tenderness] : no ~M costovertebral angle tenderness [No Prostate Nodules] : no prostate nodules [Prostate Size ___ (0-4)] : prostate size [unfilled] (scale: 0-4) [Normal Station and Gait] : the gait and station were normal for the patient's age [Urethral Meatus] : meatus normal [Penis Abnormality] : normal circumcised penis [Testes Tenderness] : no tenderness of the testes [Testes Mass (___cm)] : there were no testicular masses [FreeTextEntry1] : rectus diastasis

## 2022-05-31 NOTE — HISTORY OF PRESENT ILLNESS
[FreeTextEntry1] : 57 year old here to establish care.\par \par Patient told by PCP in past that he has enlarged prostate. Patient assumes has had PSA checked, but can't be certain. Reports nocturia 4x, but does reports drinks a lot of fluid until 11PM. Some urgency and frequency in AM after coffee, but resolves. Does not feel like empties bladder completely. Occasional straining to complete urination. Stream can be intermittent at times. Patient does snore, never been tested for KERRY.\par \par 2 brothers with prostate cancer s/p RALP. No blood in urine. No kidney stones. Recent intentional weight loss of ~12 pounds.\par \par Patient reports recent ED. Recent high anxiety and unable to have erections. Has resolved since and now no issues.\par \par PVR today 33.\par \par PMH: HTN, \par PSH: hip replacement x 2\par Meds: ASA 81\par FH: 2 brothers with prostate cancer, mother with colon and lung cancer, father had TURP\par Social: history cigar smoking, not current, no alcohol 23 years, , 3 kids\par Allergies: NKDA

## 2022-06-01 ENCOUNTER — TRANSCRIPTION ENCOUNTER (OUTPATIENT)
Age: 57
End: 2022-06-01

## 2022-06-03 LAB — PSA SERPL-MCNC: 2.14 NG/ML

## 2022-11-08 NOTE — OCCUPATIONAL THERAPY INITIAL EVALUATION ADULT - IMPAIRMENTS CONTRIBUTING IMPAIRED BED MOBILITY, REHAB EVAL
decreased strength Dorsal Nasal Flap Text: The defect edges were debeveled with a #15 scalpel blade.  Given the location of the defect and the proximity to free margins a dorsal nasal flap was deemed most appropriate.  Using a sterile surgical marker, an appropriate dorsal nasal flap was drawn around the defect.    The area thus outlined was incised deep to adipose tissue with a #15 scalpel blade.  The skin margins were undermined to an appropriate distance in all directions utilizing iris scissors.

## 2023-01-20 NOTE — PATIENT PROFILE ADULT. - FUNCTIONAL SCREEN CURRENT LEVEL: TRANSFERRING, MLM
You can access the FollowMyHealth Patient Portal offered by [facility] by registering at the following website: [email or web address]. By joining [facility]’s FollowMyHealth portal, you will also be able to view your health information using other applications (apps) compatible with our system. (2) assistive person

## 2023-02-14 NOTE — H&P PST ADULT - SURGICAL SITE INCISION
February 14, 2023      [facility]  [address]  [place] [address]  Phone: [phone]  Fax: [phone]       Patient: Lorne Gallego   YOB: 2008  Date of Visit: 02/14/2023    To Whom It May Concern:    Ankit Gallego  was at [facility] on 02/14/2023. The patient may return to work/school on 02/15/2023 with no restrictions. If you have any questions or concerns, or if I can be of further assistance, please do not hesitate to contact me.    Sincerely,    Vanita Kamara LPN     
no

## 2023-05-30 ENCOUNTER — APPOINTMENT (OUTPATIENT)
Dept: UROLOGY | Facility: CLINIC | Age: 58
End: 2023-05-30
Payer: COMMERCIAL

## 2023-05-30 ENCOUNTER — NON-APPOINTMENT (OUTPATIENT)
Age: 58
End: 2023-05-30

## 2023-05-30 VITALS
BODY MASS INDEX: 33.9 KG/M2 | RESPIRATION RATE: 17 BRPM | SYSTOLIC BLOOD PRESSURE: 164 MMHG | DIASTOLIC BLOOD PRESSURE: 90 MMHG | HEIGHT: 67 IN | TEMPERATURE: 98.7 F | WEIGHT: 216 LBS | HEART RATE: 66 BPM

## 2023-05-30 DIAGNOSIS — Z00.00 ENCOUNTER FOR GENERAL ADULT MEDICAL EXAMINATION W/OUT ABNORMAL FINDINGS: ICD-10-CM

## 2023-05-30 DIAGNOSIS — Z12.5 ENCOUNTER FOR SCREENING FOR MALIGNANT NEOPLASM OF PROSTATE: ICD-10-CM

## 2023-05-30 DIAGNOSIS — R39.9 UNSPECIFIED SYMPTOMS AND SIGNS INVOLVING THE GENITOURINARY SYSTEM: ICD-10-CM

## 2023-05-30 LAB
PSA FREE FLD-MCNC: 30 %
PSA FREE SERPL-MCNC: 0.87 NG/ML
PSA SERPL-MCNC: 2.91 NG/ML

## 2023-05-30 PROCEDURE — 99214 OFFICE O/P EST MOD 30 MIN: CPT

## 2023-05-30 RX ORDER — TADALAFIL 5 MG/1
5 TABLET ORAL
Qty: 90 | Refills: 3 | Status: ACTIVE | COMMUNITY
Start: 2023-05-30 | End: 1900-01-01

## 2023-05-30 NOTE — HISTORY OF PRESENT ILLNESS
[FreeTextEntry1] : 58  year old here for follow up \par \par Patient told by PCP in past that he has enlarged prostate. \par  Nocturia once . \par \par 2 brothers with prostate cancer s/p RALP. No blood in urine. \par \par Patient reports recent ED. Recent high anxiety and unable to have erections. Has resolved since and now no issues.\par \par PVR : 126 from 33 yesterday\par \par PMH: HTN, \par PSH: hip replacement x 2\par Meds: ASA 81\par FH: 2 brothers with prostate cancer, mother with colon and lung cancer, father had TURP\par Social: history cigar smoking, not current, no alcohol 23 years, , 3 kids\par Allergies: NKDA

## 2023-05-30 NOTE — ASSESSMENT
[FreeTextEntry1] : 57 year old with LUTS and family history of prostate cancer. PVR is increasing. \par \par - PSA, today \par -Start Cialis \par -Encouraged weight loss \par -Discussed KERRY work up , patient has refused in the past and would like to work o weight loss \par - discussed behavioral intervention for nocturia - decrease caffeine and stop fluids 3-4 hours prior to bedtime\par \par  \par  \par

## 2023-05-30 NOTE — PHYSICAL EXAM
[General Appearance - Well Developed] : well developed [General Appearance - Well Nourished] : well nourished [Normal Appearance] : normal appearance [Well Groomed] : well groomed [General Appearance - In No Acute Distress] : no acute distress [Abdomen Soft] : soft [Abdomen Tenderness] : non-tender [Costovertebral Angle Tenderness] : no ~M costovertebral angle tenderness [Urethral Meatus] : meatus normal [Penis Abnormality] : normal circumcised penis [Testes Tenderness] : no tenderness of the testes [Testes Mass (___cm)] : there were no testicular masses [No Prostate Nodules] : no prostate nodules [Prostate Size ___ (0-4)] : prostate size [unfilled] (scale: 0-4) [Edema] : no peripheral edema [] : no respiratory distress [Respiration, Rhythm And Depth] : normal respiratory rhythm and effort [Exaggerated Use Of Accessory Muscles For Inspiration] : no accessory muscle use [Normal Station and Gait] : the gait and station were normal for the patient's age [Oriented To Time, Place, And Person] : oriented to person, place, and time [Not Anxious] : not anxious [No Focal Deficits] : no focal deficits [FreeTextEntry1] : enlarged prostate with no nodule.

## 2023-06-02 ENCOUNTER — TRANSCRIPTION ENCOUNTER (OUTPATIENT)
Age: 58
End: 2023-06-02

## 2023-11-29 ENCOUNTER — TRANSCRIPTION ENCOUNTER (OUTPATIENT)
Age: 58
End: 2023-11-29

## 2024-02-13 NOTE — CONSULT NOTE ADULT - PROBLEM SELECTOR PROBLEM 2
Pt called for medication refill Meloxicam 15 mg daily to be sent to pharmacy      
Surgery follow-up

## 2024-03-11 NOTE — PHYSICAL THERAPY INITIAL EVALUATION ADULT - LEVEL OF INDEPENDENCE: SUPINE/SIT, REHAB EVAL
[facility] Call Center    Phone Message    May a detailed message be left on voicemail: yes     Reason for Call: Other: Pt was returning a call from [facility]. Pt states that she can take tomorrow appointment 3/12/24 at 8:00am. Please call Pt back to confirm at [phone]    Action Taken: Message routed to:  Other: [facility]     Travel Screening: Not Applicable                                                                    contact guard

## 2024-05-21 ENCOUNTER — APPOINTMENT (OUTPATIENT)
Dept: UROLOGY | Facility: CLINIC | Age: 59
End: 2024-05-21
Payer: COMMERCIAL

## 2024-05-21 DIAGNOSIS — N13.8 BENIGN PROSTATIC HYPERPLASIA WITH LOWER URINARY TRACT SYMPMS: ICD-10-CM

## 2024-05-21 DIAGNOSIS — N40.1 BENIGN PROSTATIC HYPERPLASIA WITH LOWER URINARY TRACT SYMPMS: ICD-10-CM

## 2024-05-21 PROCEDURE — 99214 OFFICE O/P EST MOD 30 MIN: CPT

## 2024-05-21 RX ORDER — AMOXICILLIN 500 MG/1
500 TABLET, FILM COATED ORAL
Qty: 4 | Refills: 1 | Status: DISCONTINUED | COMMUNITY
Start: 2020-03-10 | End: 2024-05-21

## 2024-05-21 RX ORDER — CEPHALEXIN 500 MG/1
500 CAPSULE ORAL 4 TIMES DAILY
Qty: 28 | Refills: 0 | Status: DISCONTINUED | COMMUNITY
Start: 2019-07-11 | End: 2024-05-21

## 2024-05-21 RX ORDER — AMOXICILLIN 500 MG/1
500 TABLET, FILM COATED ORAL
Qty: 4 | Refills: 1 | Status: DISCONTINUED | COMMUNITY
Start: 2019-11-27 | End: 2024-05-21

## 2024-05-21 RX ORDER — ATORVASTATIN CALCIUM 10 MG/1
10 TABLET, FILM COATED ORAL
Refills: 0 | Status: ACTIVE | COMMUNITY

## 2024-05-21 RX ORDER — HYDROCORTISONE 25 MG/G
2.5 CREAM TOPICAL
Qty: 1 | Refills: 0 | Status: DISCONTINUED | COMMUNITY
Start: 2019-07-11 | End: 2024-05-21

## 2024-05-21 RX ORDER — MUPIROCIN 20 MG/G
2 OINTMENT TOPICAL
Qty: 22 | Refills: 0 | Status: DISCONTINUED | COMMUNITY
Start: 2018-06-11 | End: 2024-05-21

## 2024-05-21 RX ORDER — AMOXICILLIN 500 MG/1
500 TABLET, FILM COATED ORAL
Qty: 8 | Refills: 0 | Status: DISCONTINUED | COMMUNITY
Start: 2018-08-03 | End: 2024-05-21

## 2024-05-21 RX ORDER — TRAMADOL HYDROCHLORIDE 50 MG/1
50 TABLET, COATED ORAL 3 TIMES DAILY
Qty: 90 | Refills: 0 | Status: COMPLETED | COMMUNITY
Start: 2019-07-03 | End: 2024-05-21

## 2024-05-21 RX ORDER — MUPIROCIN 20 MG/G
2 OINTMENT TOPICAL
Qty: 1 | Refills: 0 | Status: DISCONTINUED | COMMUNITY
Start: 2019-06-17 | End: 2024-05-21

## 2024-05-21 NOTE — ASSESSMENT
[FreeTextEntry1] : We will do PSA today. He is taking Tadafil. He is having some lightheadedness  He also started to take some prostate supplements  He is not sure when the lightheadedness started or if it coincides with the supplement . He is working on weight loss  His BP is high when in the office but he records it for his PCP and it is ok  Recommend he hold the supplement and see how he responds  PSA today  Will call for results

## 2024-05-21 NOTE — HISTORY OF PRESENT ILLNESS
[Nocturia] : nocturia [FreeTextEntry1] : Both brothers have prostate cancer . Both had prostatectomies  He needs a PSA Last PSA was 2.91

## 2024-05-22 LAB — PSA SERPL-MCNC: 2.36 NG/ML

## 2025-02-14 ENCOUNTER — NON-APPOINTMENT (OUTPATIENT)
Age: 60
End: 2025-02-14

## 2025-02-27 ENCOUNTER — OUTPATIENT (OUTPATIENT)
Dept: OUTPATIENT SERVICES | Facility: HOSPITAL | Age: 60
LOS: 1 days | End: 2025-02-27
Payer: COMMERCIAL

## 2025-02-27 ENCOUNTER — APPOINTMENT (OUTPATIENT)
Dept: CT IMAGING | Facility: IMAGING CENTER | Age: 60
End: 2025-02-27
Payer: COMMERCIAL

## 2025-02-27 DIAGNOSIS — Z00.8 ENCOUNTER FOR OTHER GENERAL EXAMINATION: ICD-10-CM

## 2025-02-27 DIAGNOSIS — Z96.642 PRESENCE OF LEFT ARTIFICIAL HIP JOINT: Chronic | ICD-10-CM

## 2025-02-27 PROCEDURE — 75574 CT ANGIO HRT W/3D IMAGE: CPT | Mod: 26

## 2025-02-27 PROCEDURE — 75574 CT ANGIO HRT W/3D IMAGE: CPT

## 2025-03-24 NOTE — H&P PST ADULT - BREASTS
Protocol Failed.     NOV 8/26/2025 (Y)    LOV 2/10/2025 ()    Assessment/Plan:   Exertional shortness of breath: Recovering from unspecified viral infection with severe cough which and chest discomfort with initially started on treatment for right infection.  Essential hypertension  Hyperlipidemia on atorvastatin  Significant coronary calcification incidentally noted on CT chest.  Normal echo and MPI in January 2025  PAD/carotid artery disease-sees vascular.  No intervention in the past.  CKD stage 3a      Excellent blood pressure control but hand and feet swelling with amlodipine.  I have switched him to valsartan/hydrochlorothiazide and stopped amlodipine.  BMP in 2 weeks   Diagnosis and plan of care discussed with patient and verbalized understanding.       No masses; no nipple discharge